# Patient Record
Sex: FEMALE | Race: WHITE | Employment: UNEMPLOYED | URBAN - METROPOLITAN AREA
[De-identification: names, ages, dates, MRNs, and addresses within clinical notes are randomized per-mention and may not be internally consistent; named-entity substitution may affect disease eponyms.]

---

## 2019-01-01 ENCOUNTER — DOCUMENTATION ONLY (OUTPATIENT)
Dept: ONCOLOGY | Age: 64
End: 2019-01-01

## 2019-01-01 ENCOUNTER — APPOINTMENT (OUTPATIENT)
Dept: GENERAL RADIOLOGY | Age: 64
DRG: 180 | End: 2019-01-01
Attending: STUDENT IN AN ORGANIZED HEALTH CARE EDUCATION/TRAINING PROGRAM
Payer: COMMERCIAL

## 2019-01-01 ENCOUNTER — APPOINTMENT (OUTPATIENT)
Dept: GENERAL RADIOLOGY | Age: 64
DRG: 180 | End: 2019-01-01
Attending: INTERNAL MEDICINE
Payer: COMMERCIAL

## 2019-01-01 ENCOUNTER — HOSPITAL ENCOUNTER (EMERGENCY)
Age: 64
Discharge: HOME | DRG: 180 | End: 2019-02-18
Attending: EMERGENCY MEDICINE
Payer: COMMERCIAL

## 2019-01-01 ENCOUNTER — APPOINTMENT (OUTPATIENT)
Dept: GENERAL RADIOLOGY | Age: 64
DRG: 180 | End: 2019-01-01
Attending: HOSPITALIST
Payer: COMMERCIAL

## 2019-01-01 ENCOUNTER — APPOINTMENT (OUTPATIENT)
Dept: ULTRASOUND IMAGING | Age: 64
DRG: 180 | End: 2019-01-01
Attending: HOSPITALIST
Payer: COMMERCIAL

## 2019-01-01 ENCOUNTER — TELEPHONE (OUTPATIENT)
Dept: ONCOLOGY | Age: 64
End: 2019-01-01

## 2019-01-01 ENCOUNTER — HOSPICE ADMISSION (OUTPATIENT)
Dept: HOSPICE | Facility: HOSPICE | Age: 64
End: 2019-01-01

## 2019-01-01 ENCOUNTER — OFFICE VISIT (OUTPATIENT)
Dept: ONCOLOGY | Age: 64
End: 2019-01-01

## 2019-01-01 ENCOUNTER — APPOINTMENT (OUTPATIENT)
Dept: CT IMAGING | Age: 64
DRG: 180 | End: 2019-01-01
Attending: INTERNAL MEDICINE
Payer: COMMERCIAL

## 2019-01-01 ENCOUNTER — HOSPITAL ENCOUNTER (INPATIENT)
Age: 64
LOS: 3 days | Discharge: HOSPICE | DRG: 180 | End: 2019-02-22
Attending: EMERGENCY MEDICINE | Admitting: HOSPITALIST
Payer: COMMERCIAL

## 2019-01-01 VITALS
WEIGHT: 85.8 LBS | BODY MASS INDEX: 15.79 KG/M2 | HEART RATE: 133 BPM | SYSTOLIC BLOOD PRESSURE: 109 MMHG | HEIGHT: 62 IN | RESPIRATION RATE: 24 BRPM | OXYGEN SATURATION: 97 % | DIASTOLIC BLOOD PRESSURE: 61 MMHG

## 2019-01-01 VITALS
HEART RATE: 129 BPM | TEMPERATURE: 98.5 F | WEIGHT: 88 LBS | RESPIRATION RATE: 21 BRPM | DIASTOLIC BLOOD PRESSURE: 83 MMHG | SYSTOLIC BLOOD PRESSURE: 119 MMHG | HEIGHT: 62 IN | OXYGEN SATURATION: 96 % | BODY MASS INDEX: 16.2 KG/M2

## 2019-01-01 VITALS
RESPIRATION RATE: 21 BRPM | TEMPERATURE: 99 F | WEIGHT: 88 LBS | OXYGEN SATURATION: 95 % | DIASTOLIC BLOOD PRESSURE: 66 MMHG | HEIGHT: 62 IN | BODY MASS INDEX: 16.2 KG/M2 | SYSTOLIC BLOOD PRESSURE: 97 MMHG | HEART RATE: 137 BPM

## 2019-01-01 DIAGNOSIS — C34.90 MALIGNANT NEOPLASM OF LUNG, UNSPECIFIED LATERALITY, UNSPECIFIED PART OF LUNG (HCC): Chronic | ICD-10-CM

## 2019-01-01 DIAGNOSIS — R62.7 FAILURE TO THRIVE IN ADULT: Chronic | ICD-10-CM

## 2019-01-01 DIAGNOSIS — R63.0 POOR APPETITE: ICD-10-CM

## 2019-01-01 DIAGNOSIS — R64 CACHEXIA (HCC): ICD-10-CM

## 2019-01-01 DIAGNOSIS — F41.9 ANXIETY: ICD-10-CM

## 2019-01-01 DIAGNOSIS — R07.89 CHEST WALL PAIN: ICD-10-CM

## 2019-01-01 DIAGNOSIS — J91.0 PLEURAL EFFUSION, MALIGNANT: Chronic | ICD-10-CM

## 2019-01-01 DIAGNOSIS — R45.1 AGITATION: ICD-10-CM

## 2019-01-01 DIAGNOSIS — C34.90 MALIGNANT NEOPLASM OF LUNG, UNSPECIFIED LATERALITY, UNSPECIFIED PART OF LUNG (HCC): Primary | ICD-10-CM

## 2019-01-01 DIAGNOSIS — C34.00 MALIGNANT NEOPLASM OF HILUS OF LUNG, UNSPECIFIED LATERALITY (HCC): Chronic | ICD-10-CM

## 2019-01-01 DIAGNOSIS — R06.09 OTHER FORM OF DYSPNEA: ICD-10-CM

## 2019-01-01 DIAGNOSIS — R13.10 DYSPHAGIA, UNSPECIFIED TYPE: ICD-10-CM

## 2019-01-01 DIAGNOSIS — R06.02 SOB (SHORTNESS OF BREATH): ICD-10-CM

## 2019-01-01 DIAGNOSIS — R64 MALIGNANT CACHEXIA (HCC): Chronic | ICD-10-CM

## 2019-01-01 DIAGNOSIS — K59.03 DRUG INDUCED CONSTIPATION: ICD-10-CM

## 2019-01-01 DIAGNOSIS — Z51.5 ENCOUNTER FOR PALLIATIVE CARE: ICD-10-CM

## 2019-01-01 DIAGNOSIS — Z71.89 DNR (DO NOT RESUSCITATE) DISCUSSION: ICD-10-CM

## 2019-01-01 DIAGNOSIS — Z98.890 HISTORY OF LUNG SURGERY: ICD-10-CM

## 2019-01-01 DIAGNOSIS — C34.00 MALIGNANT NEOPLASM OF HILUS OF LUNG, UNSPECIFIED LATERALITY (HCC): Primary | ICD-10-CM

## 2019-01-01 DIAGNOSIS — R06.02 SHORTNESS OF BREATH: ICD-10-CM

## 2019-01-01 DIAGNOSIS — R63.4 WEIGHT LOSS: ICD-10-CM

## 2019-01-01 DIAGNOSIS — Z51.5 DYING CARE: ICD-10-CM

## 2019-01-01 DIAGNOSIS — C34.90 STAGE 4 MALIGNANT NEOPLASM OF LUNG, UNSPECIFIED LATERALITY (HCC): Primary | ICD-10-CM

## 2019-01-01 LAB
ALBUMIN SERPL-MCNC: 2.6 G/DL (ref 3.5–5)
ALBUMIN/GLOB SERPL: 0.5 {RATIO} (ref 1.1–2.2)
ALP SERPL-CCNC: 91 U/L (ref 45–117)
ALT SERPL-CCNC: 14 U/L (ref 12–78)
ANION GAP SERPL CALC-SCNC: 10 MMOL/L (ref 5–15)
ANION GAP SERPL CALC-SCNC: 10 MMOL/L (ref 5–15)
ANION GAP SERPL CALC-SCNC: 12 MMOL/L (ref 5–15)
ANION GAP SERPL CALC-SCNC: 6 MMOL/L (ref 5–15)
APPEARANCE UR: ABNORMAL
ARTERIAL PATENCY WRIST A: YES
AST SERPL-CCNC: 12 U/L (ref 15–37)
ATRIAL RATE: 130 BPM
ATRIAL RATE: 132 BPM
BACTERIA SPEC CULT: NORMAL
BACTERIA URNS QL MICRO: ABNORMAL /HPF
BASE DEFICIT BLD-SCNC: 2 MMOL/L
BASOPHILS # BLD: 0 K/UL (ref 0–0.1)
BASOPHILS # BLD: 0 K/UL (ref 0–0.1)
BASOPHILS NFR BLD: 0 % (ref 0–1)
BASOPHILS NFR BLD: 0 % (ref 0–1)
BDY SITE: ABNORMAL
BILIRUB SERPL-MCNC: 0.3 MG/DL (ref 0.2–1)
BILIRUB UR QL: NEGATIVE
BNP SERPL-MCNC: 985 PG/ML (ref 0–125)
BODY FLD TYPE: NORMAL
BUN SERPL-MCNC: 10 MG/DL (ref 6–20)
BUN SERPL-MCNC: 11 MG/DL (ref 6–20)
BUN SERPL-MCNC: 11 MG/DL (ref 6–20)
BUN SERPL-MCNC: 13 MG/DL (ref 6–20)
BUN/CREAT SERPL: 18 (ref 12–20)
BUN/CREAT SERPL: 18 (ref 12–20)
BUN/CREAT SERPL: 20 (ref 12–20)
BUN/CREAT SERPL: 22 (ref 12–20)
CA-I BLD-SCNC: 1.21 MMOL/L (ref 1.12–1.32)
CALCIUM SERPL-MCNC: 8.9 MG/DL (ref 8.5–10.1)
CALCIUM SERPL-MCNC: 8.9 MG/DL (ref 8.5–10.1)
CALCIUM SERPL-MCNC: 9.3 MG/DL (ref 8.5–10.1)
CALCIUM SERPL-MCNC: 9.9 MG/DL (ref 8.5–10.1)
CALCULATED P AXIS, ECG09: 74 DEGREES
CALCULATED P AXIS, ECG09: 77 DEGREES
CALCULATED R AXIS, ECG10: 61 DEGREES
CALCULATED R AXIS, ECG10: 67 DEGREES
CALCULATED T AXIS, ECG11: 70 DEGREES
CALCULATED T AXIS, ECG11: 72 DEGREES
CHLORIDE SERPL-SCNC: 101 MMOL/L (ref 97–108)
CHLORIDE SERPL-SCNC: 103 MMOL/L (ref 97–108)
CHLORIDE SERPL-SCNC: 98 MMOL/L (ref 97–108)
CHLORIDE SERPL-SCNC: 99 MMOL/L (ref 97–108)
CK MB CFR SERPL CALC: NORMAL % (ref 0–2.5)
CK MB SERPL-MCNC: <1 NG/ML (ref 5–25)
CK SERPL-CCNC: 29 U/L (ref 26–192)
CO2 SERPL-SCNC: 27 MMOL/L (ref 21–32)
CO2 SERPL-SCNC: 29 MMOL/L (ref 21–32)
COLOR UR: ABNORMAL
COMMENT, HOLDF: NORMAL
CREAT SERPL-MCNC: 0.51 MG/DL (ref 0.55–1.02)
CREAT SERPL-MCNC: 0.55 MG/DL (ref 0.55–1.02)
CREAT SERPL-MCNC: 0.57 MG/DL (ref 0.55–1.02)
CREAT SERPL-MCNC: 0.74 MG/DL (ref 0.55–1.02)
DIAGNOSIS, 93000: NORMAL
DIAGNOSIS, 93000: NORMAL
DIFFERENTIAL METHOD BLD: ABNORMAL
DIFFERENTIAL METHOD BLD: ABNORMAL
EOSINOPHIL # BLD: 0.1 K/UL (ref 0–0.4)
EOSINOPHIL # BLD: 0.1 K/UL (ref 0–0.4)
EOSINOPHIL NFR BLD: 1 % (ref 0–7)
EOSINOPHIL NFR BLD: 1 % (ref 0–7)
EPITH CASTS URNS QL MICRO: ABNORMAL /LPF
ERYTHROCYTE [DISTWIDTH] IN BLOOD BY AUTOMATED COUNT: 14.4 % (ref 11.5–14.5)
ERYTHROCYTE [DISTWIDTH] IN BLOOD BY AUTOMATED COUNT: 14.5 % (ref 11.5–14.5)
ERYTHROCYTE [DISTWIDTH] IN BLOOD BY AUTOMATED COUNT: 14.6 % (ref 11.5–14.5)
ERYTHROCYTE [DISTWIDTH] IN BLOOD BY AUTOMATED COUNT: 15 % (ref 11.5–14.5)
FLUAV AG NPH QL IA: NEGATIVE
FLUBV AG NOSE QL IA: NEGATIVE
GAS FLOW.O2 O2 DELIVERY SYS: ABNORMAL L/MIN
GAS FLOW.O2 SETTING OXYMISER: 2 L/M
GLOBULIN SER CALC-MCNC: 5.4 G/DL (ref 2–4)
GLUCOSE SERPL-MCNC: 107 MG/DL (ref 65–100)
GLUCOSE SERPL-MCNC: 109 MG/DL (ref 65–100)
GLUCOSE SERPL-MCNC: 112 MG/DL (ref 65–100)
GLUCOSE SERPL-MCNC: 119 MG/DL (ref 65–100)
GLUCOSE UR STRIP.AUTO-MCNC: NEGATIVE MG/DL
GRAM STN SPEC: NORMAL
GRAM STN SPEC: NORMAL
HCO3 BLD-SCNC: 24.5 MMOL/L (ref 22–26)
HCT VFR BLD AUTO: 32.6 % (ref 35–47)
HCT VFR BLD AUTO: 33.3 % (ref 35–47)
HCT VFR BLD AUTO: 35.5 % (ref 35–47)
HCT VFR BLD AUTO: 36 % (ref 35–47)
HGB BLD-MCNC: 10.2 G/DL (ref 11.5–16)
HGB BLD-MCNC: 10.6 G/DL (ref 11.5–16)
HGB BLD-MCNC: 10.9 G/DL (ref 11.5–16)
HGB BLD-MCNC: 9.9 G/DL (ref 11.5–16)
HGB UR QL STRIP: NEGATIVE
IMM GRANULOCYTES # BLD AUTO: 0 K/UL (ref 0–0.04)
IMM GRANULOCYTES # BLD AUTO: 0.1 K/UL (ref 0–0.04)
IMM GRANULOCYTES NFR BLD AUTO: 0 % (ref 0–0.5)
IMM GRANULOCYTES NFR BLD AUTO: 1 % (ref 0–0.5)
INR PPP: 1.2 (ref 0.9–1.1)
KETONES UR QL STRIP.AUTO: 15 MG/DL
LDH FLD L TO P-CCNC: 48 U/L
LEUKOCYTE ESTERASE UR QL STRIP.AUTO: ABNORMAL
LYMPHOCYTES # BLD: 0.6 K/UL (ref 0.8–3.5)
LYMPHOCYTES # BLD: 0.7 K/UL (ref 0.8–3.5)
LYMPHOCYTES NFR BLD: 6 % (ref 12–49)
LYMPHOCYTES NFR BLD: 6 % (ref 12–49)
MCH RBC QN AUTO: 24.6 PG (ref 26–34)
MCH RBC QN AUTO: 24.7 PG (ref 26–34)
MCH RBC QN AUTO: 24.9 PG (ref 26–34)
MCH RBC QN AUTO: 25.1 PG (ref 26–34)
MCHC RBC AUTO-ENTMCNC: 29.9 G/DL (ref 30–36.5)
MCHC RBC AUTO-ENTMCNC: 30.3 G/DL (ref 30–36.5)
MCHC RBC AUTO-ENTMCNC: 30.4 G/DL (ref 30–36.5)
MCHC RBC AUTO-ENTMCNC: 30.6 G/DL (ref 30–36.5)
MCV RBC AUTO: 80.9 FL (ref 80–99)
MCV RBC AUTO: 81.8 FL (ref 80–99)
MCV RBC AUTO: 82.4 FL (ref 80–99)
MCV RBC AUTO: 82.6 FL (ref 80–99)
MONOCYTES # BLD: 0.9 K/UL (ref 0–1)
MONOCYTES # BLD: 1.1 K/UL (ref 0–1)
MONOCYTES NFR BLD: 8 % (ref 5–13)
MONOCYTES NFR BLD: 9 % (ref 5–13)
NEUTS SEG # BLD: 8.8 K/UL (ref 1.8–8)
NEUTS SEG # BLD: 9.8 K/UL (ref 1.8–8)
NEUTS SEG NFR BLD: 84 % (ref 32–75)
NEUTS SEG NFR BLD: 85 % (ref 32–75)
NITRITE UR QL STRIP.AUTO: NEGATIVE
NRBC # BLD: 0 K/UL (ref 0–0.01)
NRBC BLD-RTO: 0 PER 100 WBC
P-R INTERVAL, ECG05: 130 MS
P-R INTERVAL, ECG05: 142 MS
PCO2 BLD: 52.1 MMHG (ref 35–45)
PH BLD: 7.28 [PH] (ref 7.35–7.45)
PH FLD: 8 [PH]
PH UR STRIP: 6.5 [PH] (ref 5–8)
PLATELET # BLD AUTO: 529 K/UL (ref 150–400)
PLATELET # BLD AUTO: 566 K/UL (ref 150–400)
PLATELET # BLD AUTO: 566 K/UL (ref 150–400)
PLATELET # BLD AUTO: 584 K/UL (ref 150–400)
PMV BLD AUTO: 10.1 FL (ref 8.9–12.9)
PMV BLD AUTO: 10.5 FL (ref 8.9–12.9)
PMV BLD AUTO: 9.5 FL (ref 8.9–12.9)
PMV BLD AUTO: 9.6 FL (ref 8.9–12.9)
PO2 BLD: 68 MMHG (ref 80–100)
POTASSIUM SERPL-SCNC: 3.2 MMOL/L (ref 3.5–5.1)
POTASSIUM SERPL-SCNC: 3.2 MMOL/L (ref 3.5–5.1)
POTASSIUM SERPL-SCNC: 3.7 MMOL/L (ref 3.5–5.1)
POTASSIUM SERPL-SCNC: 4.2 MMOL/L (ref 3.5–5.1)
PROT FLD-MCNC: 2.6 G/DL
PROT SERPL-MCNC: 8 G/DL (ref 6.4–8.2)
PROT UR STRIP-MCNC: ABNORMAL MG/DL
PROTHROMBIN TIME: 12.3 SEC (ref 9–11.1)
Q-T INTERVAL, ECG07: 278 MS
Q-T INTERVAL, ECG07: 306 MS
QRS DURATION, ECG06: 54 MS
QRS DURATION, ECG06: 68 MS
QTC CALCULATION (BEZET), ECG08: 411 MS
QTC CALCULATION (BEZET), ECG08: 450 MS
RBC # BLD AUTO: 4.03 M/UL (ref 3.8–5.2)
RBC # BLD AUTO: 4.07 M/UL (ref 3.8–5.2)
RBC # BLD AUTO: 4.3 M/UL (ref 3.8–5.2)
RBC # BLD AUTO: 4.37 M/UL (ref 3.8–5.2)
RBC #/AREA URNS HPF: ABNORMAL /HPF (ref 0–5)
RBC MORPH BLD: ABNORMAL
SAMPLES BEING HELD,HOLD: NORMAL
SAO2 % BLD: 90 % (ref 92–97)
SERVICE CMNT-IMP: NORMAL
SODIUM SERPL-SCNC: 134 MMOL/L (ref 136–145)
SODIUM SERPL-SCNC: 137 MMOL/L (ref 136–145)
SODIUM SERPL-SCNC: 138 MMOL/L (ref 136–145)
SODIUM SERPL-SCNC: 140 MMOL/L (ref 136–145)
SP GR UR REFRACTOMETRY: 1.02 (ref 1–1.03)
SPECIMEN SOURCE FLD: NORMAL
SPECIMEN SOURCE FLD: NORMAL
SPECIMEN TYPE: ABNORMAL
TOTAL RESP. RATE, ITRR: 24
TROPONIN I SERPL-MCNC: <0.05 NG/ML
TSH SERPL DL<=0.05 MIU/L-ACNC: 1.21 UIU/ML (ref 0.36–3.74)
UROBILINOGEN UR QL STRIP.AUTO: 0.2 EU/DL (ref 0.2–1)
VENTRICULAR RATE, ECG03: 130 BPM
VENTRICULAR RATE, ECG03: 132 BPM
WBC # BLD AUTO: 10.3 K/UL (ref 3.6–11)
WBC # BLD AUTO: 10.4 K/UL (ref 3.6–11)
WBC # BLD AUTO: 11.7 K/UL (ref 3.6–11)
WBC # BLD AUTO: 9.9 K/UL (ref 3.6–11)
WBC URNS QL MICRO: ABNORMAL /HPF (ref 0–4)

## 2019-01-01 PROCEDURE — 96360 HYDRATION IV INFUSION INIT: CPT

## 2019-01-01 PROCEDURE — 94640 AIRWAY INHALATION TREATMENT: CPT

## 2019-01-01 PROCEDURE — 80048 BASIC METABOLIC PNL TOTAL CA: CPT

## 2019-01-01 PROCEDURE — 74011636637 HC RX REV CODE- 636/637: Performed by: INTERNAL MEDICINE

## 2019-01-01 PROCEDURE — 85025 COMPLETE CBC W/AUTO DIFF WBC: CPT

## 2019-01-01 PROCEDURE — 85610 PROTHROMBIN TIME: CPT

## 2019-01-01 PROCEDURE — 65270000032 HC RM SEMIPRIVATE

## 2019-01-01 PROCEDURE — 99284 EMERGENCY DEPT VISIT MOD MDM: CPT

## 2019-01-01 PROCEDURE — 82550 ASSAY OF CK (CPK): CPT

## 2019-01-01 PROCEDURE — 85027 COMPLETE CBC AUTOMATED: CPT

## 2019-01-01 PROCEDURE — 74011250636 HC RX REV CODE- 250/636: Performed by: INTERNAL MEDICINE

## 2019-01-01 PROCEDURE — 74011000250 HC RX REV CODE- 250: Performed by: EMERGENCY MEDICINE

## 2019-01-01 PROCEDURE — 74011000250 HC RX REV CODE- 250: Performed by: INTERNAL MEDICINE

## 2019-01-01 PROCEDURE — 96361 HYDRATE IV INFUSION ADD-ON: CPT

## 2019-01-01 PROCEDURE — 83615 LACTATE (LD) (LDH) ENZYME: CPT

## 2019-01-01 PROCEDURE — 94664 DEMO&/EVAL PT USE INHALER: CPT

## 2019-01-01 PROCEDURE — 65270000029 HC RM PRIVATE

## 2019-01-01 PROCEDURE — 88342 IMHCHEM/IMCYTCHM 1ST ANTB: CPT

## 2019-01-01 PROCEDURE — 80053 COMPREHEN METABOLIC PANEL: CPT

## 2019-01-01 PROCEDURE — 88341 IMHCHEM/IMCYTCHM EA ADD ANTB: CPT

## 2019-01-01 PROCEDURE — 87015 SPECIMEN INFECT AGNT CONCNTJ: CPT

## 2019-01-01 PROCEDURE — 81001 URINALYSIS AUTO W/SCOPE: CPT

## 2019-01-01 PROCEDURE — 92610 EVALUATE SWALLOWING FUNCTION: CPT | Performed by: SPEECH-LANGUAGE PATHOLOGIST

## 2019-01-01 PROCEDURE — 84484 ASSAY OF TROPONIN QUANT: CPT

## 2019-01-01 PROCEDURE — 93005 ELECTROCARDIOGRAM TRACING: CPT

## 2019-01-01 PROCEDURE — 36415 COLL VENOUS BLD VENIPUNCTURE: CPT

## 2019-01-01 PROCEDURE — 88112 CYTOPATH CELL ENHANCE TECH: CPT

## 2019-01-01 PROCEDURE — 74011250636 HC RX REV CODE- 250/636: Performed by: EMERGENCY MEDICINE

## 2019-01-01 PROCEDURE — 87804 INFLUENZA ASSAY W/OPTIC: CPT

## 2019-01-01 PROCEDURE — 87205 SMEAR GRAM STAIN: CPT

## 2019-01-01 PROCEDURE — 94762 N-INVAS EAR/PLS OXIMTRY CONT: CPT

## 2019-01-01 PROCEDURE — 74011250636 HC RX REV CODE- 250/636: Performed by: NURSE PRACTITIONER

## 2019-01-01 PROCEDURE — 74011000250 HC RX REV CODE- 250: Performed by: RADIOLOGY

## 2019-01-01 PROCEDURE — 36600 WITHDRAWAL OF ARTERIAL BLOOD: CPT

## 2019-01-01 PROCEDURE — 99285 EMERGENCY DEPT VISIT HI MDM: CPT

## 2019-01-01 PROCEDURE — 74011250637 HC RX REV CODE- 250/637: Performed by: INTERNAL MEDICINE

## 2019-01-01 PROCEDURE — C1729 CATH, DRAINAGE: HCPCS

## 2019-01-01 PROCEDURE — 32555 ASPIRATE PLEURA W/ IMAGING: CPT

## 2019-01-01 PROCEDURE — 71046 X-RAY EXAM CHEST 2 VIEWS: CPT

## 2019-01-01 PROCEDURE — 74011250636 HC RX REV CODE- 250/636: Performed by: HOSPITALIST

## 2019-01-01 PROCEDURE — 74011250636 HC RX REV CODE- 250/636: Performed by: RADIOLOGY

## 2019-01-01 PROCEDURE — 82803 BLOOD GASES ANY COMBINATION: CPT

## 2019-01-01 PROCEDURE — 71045 X-RAY EXAM CHEST 1 VIEW: CPT

## 2019-01-01 PROCEDURE — 74011250637 HC RX REV CODE- 250/637: Performed by: EMERGENCY MEDICINE

## 2019-01-01 PROCEDURE — 83986 ASSAY PH BODY FLUID NOS: CPT

## 2019-01-01 PROCEDURE — 84157 ASSAY OF PROTEIN OTHER: CPT

## 2019-01-01 PROCEDURE — 83880 ASSAY OF NATRIURETIC PEPTIDE: CPT

## 2019-01-01 PROCEDURE — 76450000000

## 2019-01-01 PROCEDURE — 84443 ASSAY THYROID STIM HORMONE: CPT

## 2019-01-01 PROCEDURE — 74011250637 HC RX REV CODE- 250/637: Performed by: NURSE PRACTITIONER

## 2019-01-01 PROCEDURE — 77030029684 HC NEB SM VOL KT MONA -A

## 2019-01-01 PROCEDURE — 0W993ZZ DRAINAGE OF RIGHT PLEURAL CAVITY, PERCUTANEOUS APPROACH: ICD-10-PCS | Performed by: RADIOLOGY

## 2019-01-01 PROCEDURE — 88305 TISSUE EXAM BY PATHOLOGIST: CPT

## 2019-01-01 PROCEDURE — 74011000250 HC RX REV CODE- 250: Performed by: HOSPITALIST

## 2019-01-01 RX ORDER — POTASSIUM CHLORIDE 7.45 MG/ML
10 INJECTION INTRAVENOUS ONCE
Status: DISCONTINUED | OUTPATIENT
Start: 2019-01-01 | End: 2019-01-01

## 2019-01-01 RX ORDER — HEPARIN 100 UNIT/ML
300 SYRINGE INTRAVENOUS AS NEEDED
Status: DISCONTINUED | OUTPATIENT
Start: 2019-01-01 | End: 2019-01-01 | Stop reason: HOSPADM

## 2019-01-01 RX ORDER — FAMOTIDINE 20 MG/1
20 TABLET, FILM COATED ORAL 2 TIMES DAILY
Qty: 60 TAB | Refills: 0 | Status: SHIPPED | OUTPATIENT
Start: 2019-01-01

## 2019-01-01 RX ORDER — GUAIFENESIN 600 MG/1
600 TABLET, EXTENDED RELEASE ORAL EVERY 12 HOURS
Status: DISCONTINUED | OUTPATIENT
Start: 2019-01-01 | End: 2019-01-01

## 2019-01-01 RX ORDER — LORAZEPAM 2 MG/ML
1 INJECTION INTRAMUSCULAR
Status: DISCONTINUED | OUTPATIENT
Start: 2019-01-01 | End: 2019-01-01

## 2019-01-01 RX ORDER — SODIUM CHLORIDE 9 MG/ML
100 INJECTION, SOLUTION INTRAVENOUS CONTINUOUS
Status: DISCONTINUED | OUTPATIENT
Start: 2019-01-01 | End: 2019-01-01 | Stop reason: HOSPADM

## 2019-01-01 RX ORDER — LORAZEPAM 2 MG/ML
INJECTION INTRAMUSCULAR
Status: DISCONTINUED
Start: 2019-01-01 | End: 2019-01-01

## 2019-01-01 RX ORDER — IPRATROPIUM BROMIDE AND ALBUTEROL SULFATE 2.5; .5 MG/3ML; MG/3ML
SOLUTION RESPIRATORY (INHALATION)
Status: DISCONTINUED
Start: 2019-01-01 | End: 2019-01-01 | Stop reason: HOSPADM

## 2019-01-01 RX ORDER — IPRATROPIUM BROMIDE AND ALBUTEROL SULFATE 2.5; .5 MG/3ML; MG/3ML
3 SOLUTION RESPIRATORY (INHALATION)
Status: DISCONTINUED | OUTPATIENT
Start: 2019-01-01 | End: 2019-01-01 | Stop reason: HOSPADM

## 2019-01-01 RX ORDER — PREDNISONE 20 MG/1
20 TABLET ORAL
Status: DISCONTINUED | OUTPATIENT
Start: 2019-01-01 | End: 2019-01-01 | Stop reason: HOSPADM

## 2019-01-01 RX ORDER — HYDROMORPHONE HYDROCHLORIDE 1 MG/ML
1 INJECTION, SOLUTION INTRAMUSCULAR; INTRAVENOUS; SUBCUTANEOUS
Status: COMPLETED | OUTPATIENT
Start: 2019-01-01 | End: 2019-01-01

## 2019-01-01 RX ORDER — ALPRAZOLAM 0.25 MG/1
0.25 TABLET ORAL 2 TIMES DAILY
Status: DISCONTINUED | OUTPATIENT
Start: 2019-01-01 | End: 2019-01-01 | Stop reason: HOSPADM

## 2019-01-01 RX ORDER — MORPHINE SULFATE 20 MG/ML
5 SOLUTION ORAL
Qty: 15 ML | Refills: 0 | Status: SHIPPED | OUTPATIENT
Start: 2019-01-01

## 2019-01-01 RX ORDER — ACETAMINOPHEN 325 MG/1
650 TABLET ORAL
Status: DISCONTINUED | OUTPATIENT
Start: 2019-01-01 | End: 2019-01-01 | Stop reason: HOSPADM

## 2019-01-01 RX ORDER — ACETAMINOPHEN 325 MG/1
650 TABLET ORAL
Status: COMPLETED | OUTPATIENT
Start: 2019-01-01 | End: 2019-01-01

## 2019-01-01 RX ORDER — AMOXICILLIN 250 MG
1 CAPSULE ORAL
Qty: 30 TAB | Refills: 0 | Status: SHIPPED | OUTPATIENT
Start: 2019-01-01

## 2019-01-01 RX ORDER — LORAZEPAM 2 MG/ML
0.5 INJECTION INTRAMUSCULAR ONCE
Status: COMPLETED | OUTPATIENT
Start: 2019-01-01 | End: 2019-01-01

## 2019-01-01 RX ORDER — IPRATROPIUM BROMIDE AND ALBUTEROL SULFATE 2.5; .5 MG/3ML; MG/3ML
3 SOLUTION RESPIRATORY (INHALATION)
Qty: 30 NEBULE | Refills: 0 | Status: SHIPPED | OUTPATIENT
Start: 2019-01-01

## 2019-01-01 RX ORDER — ALPRAZOLAM 0.25 MG/1
0.25 TABLET ORAL 2 TIMES DAILY
Qty: 20 TAB | Refills: 0 | Status: SHIPPED | OUTPATIENT
Start: 2019-01-01 | End: 2019-01-01

## 2019-01-01 RX ORDER — FAMOTIDINE 20 MG/1
20 TABLET, FILM COATED ORAL 2 TIMES DAILY
Status: DISCONTINUED | OUTPATIENT
Start: 2019-01-01 | End: 2019-01-01 | Stop reason: HOSPADM

## 2019-01-01 RX ORDER — AMOXICILLIN 250 MG
1 CAPSULE ORAL
Status: DISCONTINUED | OUTPATIENT
Start: 2019-01-01 | End: 2019-01-01 | Stop reason: HOSPADM

## 2019-01-01 RX ORDER — MORPHINE SULFATE 2 MG/ML
4 INJECTION, SOLUTION INTRAMUSCULAR; INTRAVENOUS
Status: COMPLETED | OUTPATIENT
Start: 2019-01-01 | End: 2019-01-01

## 2019-01-01 RX ORDER — POTASSIUM CHLORIDE 750 MG/1
40 TABLET, FILM COATED, EXTENDED RELEASE ORAL
Status: DISCONTINUED | OUTPATIENT
Start: 2019-01-01 | End: 2019-01-01

## 2019-01-01 RX ORDER — METOPROLOL TARTRATE 5 MG/5ML
5 INJECTION INTRAVENOUS ONCE
Status: COMPLETED | OUTPATIENT
Start: 2019-01-01 | End: 2019-01-01

## 2019-01-01 RX ORDER — SODIUM CHLORIDE 0.9 % (FLUSH) 0.9 %
5-40 SYRINGE (ML) INJECTION AS NEEDED
Status: DISCONTINUED | OUTPATIENT
Start: 2019-01-01 | End: 2019-01-01 | Stop reason: HOSPADM

## 2019-01-01 RX ORDER — ENOXAPARIN SODIUM 100 MG/ML
40 INJECTION SUBCUTANEOUS EVERY 24 HOURS
Status: DISCONTINUED | OUTPATIENT
Start: 2019-01-01 | End: 2019-01-01

## 2019-01-01 RX ORDER — PREDNISONE 20 MG/1
20 TABLET ORAL
Qty: 5 TAB | Refills: 0 | Status: SHIPPED | OUTPATIENT
Start: 2019-01-01 | End: 2019-01-01

## 2019-01-01 RX ORDER — ACETAMINOPHEN 325 MG/1
TABLET ORAL
Status: DISPENSED
Start: 2019-01-01 | End: 2019-01-01

## 2019-01-01 RX ORDER — MORPHINE SULFATE 15 MG/1
15 TABLET ORAL
Status: DISCONTINUED | OUTPATIENT
Start: 2019-01-01 | End: 2019-01-01 | Stop reason: HOSPADM

## 2019-01-01 RX ORDER — SODIUM BICARBONATE 42 MG/ML
2 INJECTION, SOLUTION INTRAVENOUS
Status: COMPLETED | OUTPATIENT
Start: 2019-01-01 | End: 2019-01-01

## 2019-01-01 RX ORDER — LIDOCAINE HYDROCHLORIDE 10 MG/ML
10 INJECTION, SOLUTION EPIDURAL; INFILTRATION; INTRACAUDAL; PERINEURAL ONCE
Status: COMPLETED | OUTPATIENT
Start: 2019-01-01 | End: 2019-01-01

## 2019-01-01 RX ORDER — SODIUM CHLORIDE 9 MG/ML
150 INJECTION, SOLUTION INTRAVENOUS CONTINUOUS
Status: DISPENSED | OUTPATIENT
Start: 2019-01-01 | End: 2019-01-01

## 2019-01-01 RX ORDER — MORPHINE SULFATE 15 MG/1
15 TABLET ORAL
Qty: 15 TAB | Refills: 0 | Status: SHIPPED | OUTPATIENT
Start: 2019-01-01 | End: 2019-01-01

## 2019-01-01 RX ORDER — SODIUM CHLORIDE 9 MG/ML
150 INJECTION, SOLUTION INTRAVENOUS CONTINUOUS
Status: DISCONTINUED | OUTPATIENT
Start: 2019-01-01 | End: 2019-01-01

## 2019-01-01 RX ORDER — ACETAMINOPHEN 325 MG/1
650 TABLET ORAL ONCE
Status: COMPLETED | OUTPATIENT
Start: 2019-01-01 | End: 2019-01-01

## 2019-01-01 RX ORDER — MORPHINE SULFATE 2 MG/ML
2 INJECTION, SOLUTION INTRAMUSCULAR; INTRAVENOUS ONCE
Status: COMPLETED | OUTPATIENT
Start: 2019-01-01 | End: 2019-01-01

## 2019-01-01 RX ORDER — LIDOCAINE HYDROCHLORIDE 10 MG/ML
10 INJECTION, SOLUTION EPIDURAL; INFILTRATION; INTRACAUDAL; PERINEURAL
Status: COMPLETED | OUTPATIENT
Start: 2019-01-01 | End: 2019-01-01

## 2019-01-01 RX ORDER — LORAZEPAM 2 MG/ML
1 INJECTION INTRAMUSCULAR ONCE
Status: COMPLETED | OUTPATIENT
Start: 2019-01-01 | End: 2019-01-01

## 2019-01-01 RX ORDER — FUROSEMIDE 10 MG/ML
40 INJECTION INTRAMUSCULAR; INTRAVENOUS ONCE
Status: ACTIVE | OUTPATIENT
Start: 2019-01-01 | End: 2019-01-01

## 2019-01-01 RX ORDER — ALBUTEROL SULFATE 0.83 MG/ML
SOLUTION RESPIRATORY (INHALATION)
Status: DISCONTINUED
Start: 2019-01-01 | End: 2019-01-01 | Stop reason: HOSPADM

## 2019-01-01 RX ORDER — LEVOFLOXACIN 5 MG/ML
500 INJECTION, SOLUTION INTRAVENOUS EVERY 24 HOURS
Status: DISCONTINUED | OUTPATIENT
Start: 2019-01-01 | End: 2019-01-01

## 2019-01-01 RX ORDER — NALOXONE HYDROCHLORIDE 4 MG/.1ML
SPRAY NASAL
Qty: 1 EACH | Refills: 0 | Status: SHIPPED | OUTPATIENT
Start: 2019-01-01

## 2019-01-01 RX ORDER — LORAZEPAM 2 MG/ML
0.5 CONCENTRATE ORAL
Qty: 30 ML | Refills: 0 | Status: SHIPPED | OUTPATIENT
Start: 2019-01-01

## 2019-01-01 RX ORDER — HYDROMORPHONE HYDROCHLORIDE 1 MG/ML
1 INJECTION, SOLUTION INTRAMUSCULAR; INTRAVENOUS; SUBCUTANEOUS
Status: DISCONTINUED | OUTPATIENT
Start: 2019-01-01 | End: 2019-01-01 | Stop reason: HOSPADM

## 2019-01-01 RX ORDER — SODIUM CHLORIDE 0.9 % (FLUSH) 0.9 %
5-40 SYRINGE (ML) INJECTION EVERY 8 HOURS
Status: DISCONTINUED | OUTPATIENT
Start: 2019-01-01 | End: 2019-01-01

## 2019-01-01 RX ORDER — LORAZEPAM 2 MG/ML
1 INJECTION INTRAMUSCULAR
Status: DISCONTINUED | OUTPATIENT
Start: 2019-01-01 | End: 2019-01-01 | Stop reason: HOSPADM

## 2019-01-01 RX ORDER — GUAIFENESIN 100 MG/5ML
400 SOLUTION ORAL EVERY 12 HOURS
Qty: 355 ML | Refills: 0 | Status: SHIPPED | OUTPATIENT
Start: 2019-01-01

## 2019-01-01 RX ORDER — GLYCOPYRROLATE 0.2 MG/ML
0.2 INJECTION INTRAMUSCULAR; INTRAVENOUS
Status: DISCONTINUED | OUTPATIENT
Start: 2019-01-01 | End: 2019-01-01 | Stop reason: HOSPADM

## 2019-01-01 RX ORDER — SODIUM CHLORIDE 9 MG/ML
10 INJECTION, SOLUTION INTRAVENOUS CONTINUOUS
Status: DISCONTINUED | OUTPATIENT
Start: 2019-01-01 | End: 2019-01-01 | Stop reason: HOSPADM

## 2019-01-01 RX ORDER — ACETAMINOPHEN 325 MG/1
650 TABLET ORAL
Qty: 90 TAB | Refills: 0 | Status: SHIPPED | OUTPATIENT
Start: 2019-01-01

## 2019-01-01 RX ORDER — GUAIFENESIN 100 MG/5ML
400 SOLUTION ORAL EVERY 12 HOURS
Status: DISCONTINUED | OUTPATIENT
Start: 2019-01-01 | End: 2019-01-01 | Stop reason: HOSPADM

## 2019-01-01 RX ORDER — FUROSEMIDE 10 MG/ML
40 INJECTION INTRAMUSCULAR; INTRAVENOUS ONCE
Status: COMPLETED | OUTPATIENT
Start: 2019-01-01 | End: 2019-01-01

## 2019-01-01 RX ADMIN — SODIUM CHLORIDE 150 ML/HR: 900 INJECTION, SOLUTION INTRAVENOUS at 01:39

## 2019-01-01 RX ADMIN — Medication 10 ML: at 10:04

## 2019-01-01 RX ADMIN — ACETAMINOPHEN 650 MG: 325 TABLET ORAL at 21:14

## 2019-01-01 RX ADMIN — SODIUM CHLORIDE 500 ML: 900 INJECTION, SOLUTION INTRAVENOUS at 02:09

## 2019-01-01 RX ADMIN — METHYLPREDNISOLONE SODIUM SUCCINATE 40 MG: 40 INJECTION, POWDER, FOR SOLUTION INTRAMUSCULAR; INTRAVENOUS at 15:15

## 2019-01-01 RX ADMIN — FAMOTIDINE 20 MG: 20 TABLET ORAL at 08:34

## 2019-01-01 RX ADMIN — Medication 10 ML: at 06:26

## 2019-01-01 RX ADMIN — METOPROLOL TARTRATE 5 MG: 1 INJECTION, SOLUTION INTRAVENOUS at 03:07

## 2019-01-01 RX ADMIN — Medication 10 ML: at 14:30

## 2019-01-01 RX ADMIN — LIDOCAINE HYDROCHLORIDE 10 ML: 10 INJECTION, SOLUTION EPIDURAL; INFILTRATION; INTRACAUDAL; PERINEURAL at 14:00

## 2019-01-01 RX ADMIN — MORPHINE SULFATE 4 MG: 2 INJECTION, SOLUTION INTRAMUSCULAR; INTRAVENOUS at 09:17

## 2019-01-01 RX ADMIN — LORAZEPAM 1 MG: 2 INJECTION INTRAMUSCULAR; INTRAVENOUS at 12:21

## 2019-01-01 RX ADMIN — HYDROMORPHONE HYDROCHLORIDE 0.5 MG: 1 INJECTION, SOLUTION INTRAMUSCULAR; INTRAVENOUS; SUBCUTANEOUS at 10:03

## 2019-01-01 RX ADMIN — SODIUM CHLORIDE 100 ML/HR: 900 INJECTION, SOLUTION INTRAVENOUS at 19:20

## 2019-01-01 RX ADMIN — LORAZEPAM 0.5 MG: 2 INJECTION INTRAMUSCULAR; INTRAVENOUS at 22:24

## 2019-01-01 RX ADMIN — ACETAMINOPHEN 650 MG: 325 TABLET ORAL at 21:46

## 2019-01-01 RX ADMIN — ALBUTEROL SULFATE 1 DOSE: 2.5 SOLUTION RESPIRATORY (INHALATION) at 20:16

## 2019-01-01 RX ADMIN — LORAZEPAM 1 MG: 2 INJECTION INTRAMUSCULAR; INTRAVENOUS at 09:17

## 2019-01-01 RX ADMIN — MORPHINE SULFATE 2 MG: 2 INJECTION, SOLUTION INTRAMUSCULAR; INTRAVENOUS at 14:29

## 2019-01-01 RX ADMIN — SENNOSIDES AND DOCUSATE SODIUM 1 TABLET: 8.6; 5 TABLET ORAL at 21:45

## 2019-01-01 RX ADMIN — GUAIFENESIN 600 MG: 600 TABLET, EXTENDED RELEASE ORAL at 12:20

## 2019-01-01 RX ADMIN — IPRATROPIUM BROMIDE AND ALBUTEROL SULFATE 3 ML: .5; 3 SOLUTION RESPIRATORY (INHALATION) at 01:30

## 2019-01-01 RX ADMIN — ALPRAZOLAM 0.25 MG: 0.25 TABLET ORAL at 10:17

## 2019-01-01 RX ADMIN — SODIUM CHLORIDE 1000 ML: 900 INJECTION, SOLUTION INTRAVENOUS at 17:52

## 2019-01-01 RX ADMIN — PREDNISONE 20 MG: 20 TABLET ORAL at 12:21

## 2019-01-01 RX ADMIN — SODIUM CHLORIDE 100 ML/HR: 900 INJECTION, SOLUTION INTRAVENOUS at 19:24

## 2019-01-01 RX ADMIN — ALPRAZOLAM 0.25 MG: 0.25 TABLET ORAL at 08:34

## 2019-01-01 RX ADMIN — SODIUM CHLORIDE 150 ML/HR: 900 INJECTION, SOLUTION INTRAVENOUS at 14:31

## 2019-01-01 RX ADMIN — FUROSEMIDE 40 MG: 10 INJECTION, SOLUTION INTRAMUSCULAR; INTRAVENOUS at 21:31

## 2019-01-01 RX ADMIN — ALBUTEROL SULFATE 1 DOSE: 2.5 SOLUTION RESPIRATORY (INHALATION) at 19:59

## 2019-01-01 RX ADMIN — PREDNISONE 20 MG: 20 TABLET ORAL at 08:34

## 2019-01-01 RX ADMIN — HYDROMORPHONE HYDROCHLORIDE 1 MG: 1 INJECTION, SOLUTION INTRAMUSCULAR; INTRAVENOUS; SUBCUTANEOUS at 15:12

## 2019-01-01 RX ADMIN — IPRATROPIUM BROMIDE AND ALBUTEROL SULFATE 3 ML: .5; 3 SOLUTION RESPIRATORY (INHALATION) at 07:11

## 2019-01-01 RX ADMIN — ACETAMINOPHEN 650 MG: 325 TABLET ORAL at 10:19

## 2019-01-01 RX ADMIN — Medication 300 UNITS: at 10:11

## 2019-01-01 RX ADMIN — SODIUM BICARBONATE 2 ML: 0.2 INJECTION, SOLUTION INTRAVENOUS at 14:00

## 2019-01-01 RX ADMIN — SODIUM CHLORIDE 500 ML: 900 INJECTION, SOLUTION INTRAVENOUS at 01:11

## 2019-01-01 RX ADMIN — MORPHINE SULFATE 15 MG: 15 TABLET ORAL at 23:42

## 2019-01-01 RX ADMIN — LORAZEPAM 0.5 MG: 2 INJECTION INTRAMUSCULAR; INTRAVENOUS at 03:58

## 2019-01-01 RX ADMIN — ALPRAZOLAM 0.25 MG: 0.25 TABLET ORAL at 17:42

## 2019-01-01 RX ADMIN — GUAIFENESIN 400 MG: 200 SOLUTION ORAL at 08:49

## 2019-01-01 RX ADMIN — FAMOTIDINE 20 MG: 20 TABLET ORAL at 17:42

## 2019-01-01 RX ADMIN — ACETAMINOPHEN 650 MG: 325 TABLET ORAL at 01:11

## 2019-01-01 RX ADMIN — GUAIFENESIN 600 MG: 600 TABLET, EXTENDED RELEASE ORAL at 21:45

## 2019-01-01 RX ADMIN — LEVOFLOXACIN 500 MG: 5 INJECTION, SOLUTION INTRAVENOUS at 01:47

## 2019-01-01 RX ADMIN — SODIUM BICARBONATE 84 MG: 42 INJECTION, SOLUTION INTRAVENOUS at 13:49

## 2019-02-19 PROBLEM — R06.02 SHORTNESS OF BREATH: Status: ACTIVE | Noted: 2019-01-01

## 2019-02-19 NOTE — TELEPHONE ENCOUNTER
We can see pt today   But seen by [facility] in past  Are we sure they dont want to go back to [facility]? Was seen at [facility] also  Maybe at 3:30 today?   3 p new breast is not new

## 2019-02-19 NOTE — PROGRESS NOTES
Dima Pimentel is a 66 y.o. female here today for lung cancer.    asking for patient to have direct admit to hospital.

## 2019-02-19 NOTE — PROGRESS NOTES
Per [facility] note:    Prior treatment:  Right lower lobe lobectomy - 11/20/17  Adjuvant carboplatin (~450mg)/Alimta (500mg/m2): 12/28/17 - 3/14/18   ChemoRT (carboplatin/taxol x 7 weekly cycles + 60Gy): 6/7/18 - 7/20/18  Durvalumab: 8/10/18 - 1/10/19

## 2019-02-19 NOTE — PROGRESS NOTES
[facility] [place] at [address], [address] [place], [address]: [phone]  F: [phone]    Reason for Visit:   Evangelina Jameson is a 66 y.o. female who is seen in consultation at the request of Dr. Martha Maxwell for evaluation of lung cancer. Treatment History:   [facility]  Lung [facility] at [facility]  Records pending    STAGE:  Unclear ? 4       History of Present Illness:     Pt seen today urgently per pt / ER request for chronic lung cancer. [facility] wanting [facility] care here. No records here now. Dx  at [facility] RLL surgery at thoracic surgery. Insurance sent pt to [place] [facility]. Had chemo / XRT last year. Pt did not complete durvalumab . Pt last seen at [facility] last week for a second opinion and pt did not want to go there. Also seen at [facility] recently Dr Rom Salcido and was supposed to do chemo and pt did not want to do this. Pt does not feel strong enough for chemo. Pt went to our ER for SOB and had CXR and has new pleural effusion. Pt left and did not want to be admitted. Pt and  now want to go to ER for admit and help with pleural effusion. Had URI and was on abx but could not take them. Pt is not on any meds/ cannot swallow pills. Feels dehydrated. Pt is having a hard time walking due to fatigue and SOB. Pt is afraid of having thoracentesis. Has mid central chest wall pain and taking liquid tylenol and advil for this.          Past Medical History:   Diagnosis Date    Cancer [facility])     Lung cancer, primary, with metastasis from lung to other site, right [facility])       Past Surgical History:   Procedure Laterality Date    HX TUMOR REMOVAL        Social History     Tobacco Use    Smoking status: Former Smoker     Last attempt to quit: 2017     Years since quittin.4    Smokeless tobacco: Never Used   Substance Use Topics    Alcohol use: No     Frequency: Never      No family history on file.  No current outpatient medications on file. No current facility-administered medications for this visit. No Known Allergies     Review of Systems: A complete review of systems was obtained, negative except as described above. Physical Exam:     Visit Vitals  /61   Pulse (!) 133   Resp 24   Ht 5' 2\" (1.575 m)   Wt 85 lb 12.8 oz (38.9 kg)   SpO2 97%   BMI 15.69 kg/m²     ECOG PS: 1-2   General: cachetic WF with audible breathing labored  Eyes:  anicteric sclerae  HENT: Atraumatic, OP clear  Neck: Supple  Respiratory: decreased bs right base, course bs diffusely  CV: Normal rate, regular rhythm  GI: Soft, nontender, nondistended  MS: general weakness  Skin: No rashes, ecchymoses, or petechiae. Normal temperature, turgor, and texture. Psych: Alert, oriented, appropriate affect, normal judgment/insight    Results:     Lab Results   Component Value Date/Time    WBC 10.4 02/18/2019 06:00 PM    HGB 10.6 (L) 02/18/2019 06:00 PM    HCT 35.5 02/18/2019 06:00 PM    PLATELET 865 (H) 53/85/7245 06:00 PM    MCV 82.6 02/18/2019 06:00 PM    ABS. NEUTROPHILS 8.8 (H) 02/18/2019 06:00 PM     Lab Results   Component Value Date/Time    Sodium 134 (L) 02/18/2019 06:00 PM    Potassium 4.2 02/18/2019 06:00 PM    Chloride 99 02/18/2019 06:00 PM    CO2 29 02/18/2019 06:00 PM    Glucose 112 (H) 02/18/2019 06:00 PM    BUN 13 02/18/2019 06:00 PM    Creatinine 0.74 02/18/2019 06:00 PM    GFR est AA >60 02/18/2019 06:00 PM    GFR est non-AA >60 02/18/2019 06:00 PM    Calcium 9.9 02/18/2019 06:00 PM     Lab Results   Component Value Date/Time    Bilirubin, total 0.3 02/18/2019 06:00 PM    ALT (SGPT) 14 02/18/2019 06:00 PM    AST (SGOT) 12 (L) 02/18/2019 06:00 PM    Alk. phosphatase 91 02/18/2019 06:00 PM    Protein, total 8.0 02/18/2019 06:00 PM    Albumin 2.6 (L) 02/18/2019 06:00 PM    Globulin 5.4 (H) 02/18/2019 06:00 PM         Records reviewed and summarized above. Pathology report(s) reviewed above.   Radiology report(s) reviewed above. Assessment/PLAN:     1)  Lung cancer ? Stage 4 seen by multiple sites [facility]/ [facility]/ [facility] [place]  No records here. Trying to get records. Unclear treatment history except for prior chemo ? At [facility] or at [facility]. Prior lung surgery at [facility]. ? Prior radiation / not sure where. Met with pt and  urgently today per pt request for office visit. Pt is SOB and states not doing well overall. Was in our ER yesterday and has right pleural effusion. Per pt/ , ER wanted to admit pt but pt did not want to stay. Pt and  now want to be admitted for pain/ symptom mgmt. Long discussion with pt/  today about treatment for cancer. Pt and  do not want any further chemo or radiation. [facility] wanted pt to have a feeding tube for further therapy. Would not recommend this. Unclear if pt's SOB is new or old. Unclear if related to cancer vs COPD. Discussed code status and pt/  have not discussed this before. Pt/ will consider. Will take pt to ER. Recommend palliative care after admit. Goal of care is palliative. Needs continued discussion about code status and goals of care. 2) chest wall pain. Taking liquid tyleno/ advil due to not being able to swallow. Palliative care for pain mgmt. 3) difficulty swallowing/ volume depletion. ? Due to cancer vs cachexia vs other. ? GI eval needed. IVF needed. Need records. 4) psychosocial.   supportive. Pt mood quiet and distressed. SW support today. To ER per pt /  request.  We will follow once admitted. Call if questions  I appreciate the opportunity to participate in Ms. Chrissy Reid's care.     Signed By: Laure Funez, DO

## 2019-02-19 NOTE — TELEPHONE ENCOUNTER
Per Mellissa Salter patient was called for appt and stated that was on was to hosptial.  RN returned call to patient and recd VM. Need to verify status of appointment or ED visit. Message left to return call.

## 2019-02-19 NOTE — PROGRESS NOTES
This note will not be viewable in [address] Oncology Navigator Psychosocial AssessmentReason for Assessment:   
[x]Depression  [x]Anxiety  []Caregiver [place]  []Maladaptive Coping with Serious Illness   [x]Other: Initial assessment Patient has a documented history of anxiety and depression. Sources of Information:   
[x]Patient  []Family  []Staff  []Medical Record Advance Care Planning: No flowsheet data found. Patient does not have an advanced medical directive, and does not wish to complete one at this time. Mental Status:   
[x]Alert  []Lethargic  []Unresponsive Oriented to:  [x]Person  [x]Place  [x]Time  [x]Situation Barriers to Learning:   
[]Language  []Developmental  []Cognitive  []Altered Mental Status  []Visual/Hearing Impairment  []Unable to Read/Write  []Motivational   [x]No Barriers Identified  []Other: 
 
Relationship Status: 
[]Single  [x]  []Significant Other/Life Partner  []  []  [] Living Circumstances: 
[]Lives Alone  [x]Family/Significant Other in Household  []Roommates  []Children in the Home  []Paid Caregivers  []Assisted Living Facility/Group Home  []Skilled [address]  []Homeless  []Incarcerated  []Environmental/Care Concerns  []Other: 
 
Support System:   
[x]Strong  []Fair  []Limited Financial/Legal Concerns:   
[]Uninsured  []Limited Income/Resources  []Non-Citizen  [x]No Concerns Identified  []Financial POA:   
[]Other: 
 
Denominational/Spiritual/Existential: 
[]Strong Sense of Spirituality  []Involved in [employer] []Request  Visit  []Expressing Spiritual/Existential Angst  [x]No Concerns Identified Coping with Illness:       
 Patient: Family/Caregiver:  
Understanding and Acceptance of Illness/Prognosis  [x] [] Strong Sense of Resilience [] []  
Self Reflection [x] [] Engaged Support System [x] [] Does not Readily Discuss Illness [] [] Denial of Terminal Status [] [] Anger [] [] Depression [] [] Anxiety/Fear [] []  
Bargaining [] [] Recent Diagnosis/Prognosis [] [] Difficulties with Body Image [] [] Loss of Identity [] [] Excessive Substance Use [] [] Mental Health History [] []  
Enmeshed Relationships [] [] History of Loss [] [] Anticipatory Grief [] [] Concern for Complicated Grief [] [] Suicidal Ideation or Plan [] [] Unable to assess [] []  
         
Narrative: Met with the patient and her  during initial visit today. Patient is being evaluated for lung cancer. The patient and her  live in [place], [place]. They have two adult children, one of whom lives in [place]. She has a PCP - Dr. Jaycob Chase. The patient explained that she has been treated with 4 rounds of chemotherapy, but did not complete the last round, as it was a year long. The patient has also had radiation. Her  explained \"We purchased an [employer] policy that turned out to be [place] includes the [facility]. We had to drive 96 miles each way to [place] to get all of her treatments. We have [place] now, so we want services here. \" Her  stated \"[facility] tried to put a feeding tube in her so that they could do more chemotherapy. We don't want anymore chemotherapy or radiation treatments. \"  The patient explained that she has been unable to swallow for the past 6-8 weeks, resulting in further weight loss. She has also been having breathing difficulties. The couple plan to return to the [facility] ER now, and the patient stated \"Maybe I need a feeding tube put in now. \"     
 
Referrals:   
 
I. Transportation Medicaid (Verna Mcclain) [] [facility] [] Regional organization  [] Financial Assistance/Medication Access Patient assistance program (Care Card) [] Co-pay assistance  [] Leukemia & Lymphoma Society [] [address]  [] Patient One Shira Villalobos [] CancerCare  [] Emotional support Peer support group [] Local counseling [] Online support group [] Coordination of psychiatry consult [] Goals/Plan:  
1. Introduced self and role of this  in the [address] 2.  Informed the patient of the [facility] and available resources there. 3.  Continue to meet with the patient when she returns to the clinic for ongoing assessment of the patients adjustment to her diagnosis and treatment. 4.  Ongoing psychosocial support as desired by patient.    
LYUDMILA Manrique

## 2019-02-19 NOTE — ED NOTES
and patient prefer not to have another EKG and chest x-ray since just seen here last night and had them done. Talked to Dr. Corinne Care and stated to get CBC and BMP so we can admit.

## 2019-02-19 NOTE — TELEPHONE ENCOUNTER
Patients  called and stated that for him to get the records it can take up to 10 days and this is a urgent matter if we can fax over a request.    Fax # [phone]

## 2019-02-19 NOTE — PROGRESS NOTES
Needs records from [address] [place], [place] [address]e.  Need records from [facility]

## 2019-02-19 NOTE — PROGRESS NOTES
[facility] records are in [facility]. To GUALBERTO for Rajesh records.

## 2019-02-19 NOTE — LETTER
2/19/2019 4:29 PM 
 
Patient:  Kate Haider YOB: 1955 Date of Visit: 2/19/2019 Dear No Recipients: Thank you for referring Ms. Efrain Phoenix to me for evaluation/treatment. Below are the relevant portions of my assessment and plan of care. If you have questions, please do not hesitate to call me. I look forward to following Ms. Donald Fox along with you. Sincerely, Shyla Guillermo, DO

## 2019-02-19 NOTE — ED NOTES
Discharge instructions reviewed by provider; pt verbalized understanding of discharge and medication use. Pt is aware of medical needs, and states she wants to go home. Vitals updated, IV DC'ed and pt wheeled from ED to family car.

## 2019-02-19 NOTE — ED TRIAGE NOTES
Triage Note: patient was seen here last night for shortness of breath and weakness and received fluids. Patient was brought back down from [facility] for fluids.

## 2019-02-19 NOTE — TELEPHONE ENCOUNTER
stated patient was seen in ED 2/18/19 and that wife needed to be seen by onoclogy today for progressive shortness of breath/weight loss. Hx:  Diagnosed with lung cancer 2017 @ [facility] and underwent care with [facility]. Due to insurance change transferred care to [facility], but could not travel. RN advised that appointment available, but if patient is short of breath and having urgent symptoms needs to be seen in ED.  verbalized understanding. RN will obtain [facility] records and  will obtain additional records for provider review.  stated if patient symptoms warrant will proceed to ED. Record request faxed complete to [facility]. To [facility] for records. To provider for review.

## 2019-02-19 NOTE — ED TRIAGE NOTES
[facility], reports being seen last night for shortness of breath. Did not want to be admitted, but shortness of breath has worsened

## 2019-02-20 PROBLEM — J91.0 PLEURAL EFFUSION, MALIGNANT: Chronic | Status: ACTIVE | Noted: 2019-01-01

## 2019-02-20 PROBLEM — R64 MALIGNANT CACHEXIA (HCC): Chronic | Status: ACTIVE | Noted: 2019-01-01

## 2019-02-20 PROBLEM — R62.7 FAILURE TO THRIVE IN ADULT: Chronic | Status: ACTIVE | Noted: 2019-01-01

## 2019-02-20 PROBLEM — C34.90 LUNG CANCER (HCC): Chronic | Status: ACTIVE | Noted: 2019-01-01

## 2019-02-20 NOTE — ROUTINE PROCESS
HR down to 106, Dr. Diane Mcfarland notified. Pt c/o anxiety due to SOB. Dr. Diane Mcfarland paged and ordered Ativan 0.5mg IV.

## 2019-02-20 NOTE — PROGRESS NOTES
NUTRITION 
 
RECOMMENDATIONS:  
 
1. Await SLP evaluation before ordering a po diet 2. If pt is deemed safe for at least full liquids, please order Ensure Enlive for all meals 3. In the event pt/family choose enteral support, suggest the following, using ND tube if possible: 
 
---- Osmolite 1.5, starting at 10 ml/hr x 8 hours 
---- Increase rate by 10 ml/hr every 8 hours until at goal of 40 ml/hr 
---- Give 90 ml water flush every 4 hours 
---- Goal will provide:  1440 kcals, 60 gm pro, 1270 ml free fluid 4. Pt is at high risk for refeeding syndrome. Please monitor BMP, Phos and Mg while increasing tube feeding RD INTERVENTION:   
Food/Nutrient Delivery:  Supplements, ? Tube feeding Nutrition Education: ,   
Nutrition Counseling:   
Coordination of Care:    
ASSESSMENT:  
Reason for Assessment:  [] MD Consult   []MST Referral/ BPA   []LOS   []Follow Up   [x] Other -- seen d/t very low weight and cancer dx PMHx:  Pt is a 66year old female admitted last evening d/t worsening SOB. Met with pt and her son, and also spoke with her physician. Pt was diagnosed with lung cancer in 09/2017, at which time she stopped smoking. Pt has had very poor intake for many months, and over the past few weeks has only tolerated liquids (juice, smoothies, etc). Pt is visibly very short of breath, and has needed several doses of ativan d/t anxiety with her SOB. Pt seems to have good social support with her  and family. Pt and  have gone back and forth re: initiating tube feedings. Per MD he will enlist help from hospice/palliative care in regards to some of these decisions to be made. Pt may not be a good candidate for aggressive nutrition support, given her advanced stage of cancer (stage 4), her respiratory issues and overall prognosis; but RD is of course available to assist with tube feeding management if this is desired. Based on pt's usual weight of 52.3 kg, she has lost over 12 kg in the past many months. This equates to a 23% loss of total body weight, which is severe. Meets Criteria for Severe Chronic Malnutrition as evidenced by: 
 [x] Severe muscle wasting, loss of subcutaneous fat 
 [x] Nutritional intake of <75% of recommended intake for >1 month 
 [x] Weight loss of  >5% in 1 month, >7.5% in 3 months, >10% in 6 months, >20% in 1 year 
 [] Severe edema Nutrition History: 
Usual Appetite: Poor Diet at Home: (has only tolerated liquids for past few weeks) Vitamins/Supplements: No 
 
Current Nutritional Intake:  
Diet Order:    
Appetite: Poor PO Ability: With assist 
Documented %PO Intake: 
No data found. Avg %meal intake: Average supplements intake: Average kcal/pro intake:   
Estimated Nutrition Needs Met based on Average %meal intake: 
Calories %:   
Protein %:   
  
 
Anthropometrics:  
Last 3 Recorded Weights in this Encounter 02/19/19 2205 Weight: 39.9 kg (88 lb) Weight Source: Patient stated Height: 5' 2\" (157.5 cm), Body mass index is 16.1 kg/m². IBW : (~ 52 kg),  , Usual Body Weight: (115 pounds / 52.3 kg), Estimated Daily Nutrition Requirements: Weight Used: UBW(52.3 kg) Kcals: (~ 0046-7434 kcals/day) Based on:[address] Protein: (1.2-1.4 gm pro/kg UBW or 60-70 gm) Fluid: (~ 1 ml/kcal) GI / Oral / Respiratory Concerns Abd:  Non-tender BM:  unknown 
 ; Chew/Swallow: Difficulty (comment)(due in part to SOB);  ; 
 ;   
 
Skin Integrity: [x]Intact  []Other Edema: [x]None []Other Food Allergies: [x]None []Other Nutritionally Significant Labs & Medications: [x] Reviewed Education & Discharge Needs: 
 [x] None Identified 
 [] Identified and addressed [x] Participated in care plan, discharge planning, Interdisciplinary rounds Diet Restrictions: 
Cultural/Mandaen Preference(s): None NUTRITION DIAGNOSIS:  
 
 Underweight related to significant decrease in oral intake as evidenced by weight loss of about 12 kg over past several months Pt is at Nutrition Risk:  [x] No Nutrition Risk Identified:  [] 
 
PT GOALS:  
 
1. Pt will tolerate least restrictive po diet over the next week MONITORING & EVALUATION:  
 Food/Nutrient Intake Outcomes: Total energy intake, Oral fluids amount Physical Signs/Symptoms Outcomes: Weight/weight change Previous Nutrition Goals: 
Previous Goal Met: N/A Previous Recommendations:     
Previous Recommendations Implemented: N/A Fanny Frank, [address] [place] [address], [address]

## 2019-02-20 NOTE — HOSPICE
[facility] RN note:  Consult noted. Reviewing chart. Discussed pt with Dr Izzy Cote (hospice medical director), staff RN Lelo Portillo. Will meet with family shortly. 4:30 met with  at bedside. Pt is unresponsive but reportedly more comfortable after PRN dose of 1mg IV dilaudid and 0.5mg IV ativan.  is tearful, relying on fernanda but clearly heartbroken. Anticipating arrival of oldest son and pt's sister describes as \"not accepting, wanting anything to keep her breathing. \"  
 
Multiple family members arriving, grieving intensely.  left hurredly to care for pt's dog. He requested that I return tomorrow to talk further about hospice if appropriate. 5:30---sister Tammy Santana arrived from out of town, states she is in \"shock,\"  Unconsolably tearful. Agreeing to  support.  paged. General Record guest house request placed for family arriving from out of town. Thank you for the opportunity to care for this pt and family. Please contact hospice at [phone] with any questions or concerns.

## 2019-02-20 NOTE — PROGRESS NOTES
Will call md just arrived from ed Stat ekg ordered rt notified [phone] Sinus tach per ekg--Dr torre informed-- md requested metoprolol to be given-- patient order to be transferred to remote tele-- report given to [facility] and she voiced concerns regarding mews of 5. Informed renu nursing supervisor regarding concerns . Patient transferred with mews of 5 related to heart rate and need for metotoprolol per nursing supervisor.

## 2019-02-20 NOTE — PROGRESS NOTES
Request by Palliative NP Wilman to join her in pt's room as pt's condition had changed. Pt's , son, and daughter-in-law were tearful at bedside. Offered expressions of comfort and assurance. Pt's  spoke of pt's fernanda in God and their spiritual beliefs that bring them comfort at this time. Affirmed their fernanda and offered prayer with family at pt's bedside. Pt able to express her love for her family as her  engaged with her following our prayer. Anticipatory grief support offered to family. Another son is on his way to the hospital. Visit ended to allow family some private time at bedside. Assured them of prayers and of  availability. Pt's case discussed with Spiritual Care team regarding follow-up visit for continued family support; staff can page the [id number] as needed at [address] [place] [address]. Marian Angela, Palliative

## 2019-02-20 NOTE — ACP (ADVANCE CARE PLANNING)
Advance Care Planning Note Name: Michelle Bartlett YOB: 1955 MRN: [id number] Admission Date: 2/19/2019  6:25 PM 
 
Date of discussion: 2/20/2019 Active Diagnoses: 
 
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 
ICD-9-CM: 799.4  2/20/2019 Yes Failure to thrive in adult (Chronic) ICD-10-CM: R62.7 ICD-9-CM: 783.7  2/20/2019 Yes * (Principal) Pleural effusion, malignant (Chronic) ICD-10-CM: J91.0 ICD-9-CM: 511.81  2/20/2019 Yes Lung cancer ([facility].) (Chronic) ICD-10-CM: C34.90 ICD-9-CM: 162.9  2/20/2019 Yes Shortness of breath ICD-10-CM: R06.02 
ICD-9-CM: 786.05  2/19/2019 Yes These active diagnoses are of sufficient risk that focused discussion on advance care planning is indicated in order to allow the patient to thoughtfully consider personal goals of care, and if situations arise that prevent the ability to personally give input, to ensure appropriate representation of their personal desires for different levels and aggressiveness of care. Discussion:  
 
Persons present and participating in discussion: Alfie Freire MD, son and daughter in law Discussion: CPR/ACLS/Cardioversion/Intubation/BiPAP/Renal failure requiring dialysis Family is aware that mom has poor prognosis, but since they are discussing further care, they want mom to be full code until they discuss with palliative care team and hospice team, for potential comfort care status. Addendum: 
DW family in detail, she is declining quickly and has been made comfort care. DW palliative care DNR Time Spent:  
 
Total time spent face-to-face in education and discussion: 19 minutes.   
 
Russ Andino MD 
2/20/2019 
1:43 PM

## 2019-02-20 NOTE — PROGRESS NOTES
Responded to staff page to room 540. Family members gathered around bed at time of visit.  listened as family introduced themselves. Sister of pt shared that pt likes to plane gardens. As she mentioned that, the pt softly responded. During visit other family members arrived. Comfort tray ordered.  prayed with family and offered continued spiritual support. Advised of  Availability. Chaplains will follow as able and/or needed. Uriah Borrego,  Intern, Andrea Diaz ([id number])

## 2019-02-20 NOTE — PROGRESS NOTES
1310 - spoke with Dr. Osmel Unger reported patients mental status and heart rate of 131 to 133. Dr. Osmel Unger communicated that he is aware of patient's heartrate and performing thoracentesis may help her. Will report conversation with Dr. Aleena Martino. Patient received ativan at [phone] and is currently resting with eyes closed, delayed responses to questions asked. Patient's  consented for procedure. 1328 - reported patients vital signs and symptoms with Dr. Aleena Martino. Dr. Aleena Martino talking with  who consented to go forward with procedure. 1345 - patient tolerated procedure well. See doc flow sheet for vital signs.  at bedside.

## 2019-02-20 NOTE — ROUTINE PROCESS
TRANSFER - OUT REPORT: 
 
Verbal report given to Mara Colorado (name) on Santosh Monroy  being transferred to [address] [place] [address] (unit) for routine progression of care Report consisted of patients Situation, Background, Assessment and  
Recommendations(SBAR). Information from the following report(s) SBAR, ED Summary and Recent Results was reviewed with the receiving nurse. Lines:  
Venous Access Device 02/19/19 Upper chest (subclavicular area, right (Active) Central Line Being Utilized Yes 2/19/2019  8:24 PM  
Site Assessment Clean, dry, & intact 2/19/2019  8:24 PM  
Date of Last Dressing Change 02/19/19 2/19/2019  8:24 PM  
Dressing Status Clean, dry, & intact 2/19/2019  8:24 PM  
Dressing Type Disk with Chlorhexadine gluconate (CHG); Transparent 2/19/2019  8:24 PM  
  
 
Opportunity for questions and clarification was provided. Patient transported with: 
Transport

## 2019-02-20 NOTE — PROGRESS NOTES
Cm informed that there was a hospice consult. Referral sent to [facility].  
Primo Dee BSW, ACM

## 2019-02-20 NOTE — H&P
[address] HISTORY AND PHYSICAL Name:  Maria Del Rosario Trotter 
MR#:  [id number] :  1955 ACCOUNT #:  [de-identified] ADMIT DATE:  2019 PRIMARY CARE PHYSICIAN:  Shiloh Blackburn NP 
 
ONCOLOGIST:  Michel Cheney DO 
 
PRESENTING COMPLAINT:  Sent from Dr. Karlie Brannon office for admission. HISTORY OF PRESENT ILLNESS:  The patient is a 57-year-old female who has history of 
stage IV lung cancer. The patient has been treated at [facility] and [facility]. She had right 
lower lobe removed by thoracic surgery in 2017 and later on due to insurance issues, 
she was transferred to different provider. She had chemo and radiation last year. The patient did not complete her chemotherapy with durvalumab because she did not 
tolerate it. The patient was seen at the [facility] last week for second opinion. The 
patient has decided that she does not want any chemo or radiation therapy. The 
patient was seen by Dr. Ismael Dillon today who sent her for admission. It is of note 
that she came to ER last night for shortness of breath. At that time, a chest x-ray 
was done which showed new pleural effusion but the patient did not want to be 
admitted at that time, on further questioning the patient's family including  
says that they are thinking about getting the pleural effusion drained. The patient 
is currently on no medications except Motrin. In the emergency room, she has refused a breathing 
treatment. The patient is also having problem with her swallowing. According to the 
 that she can swallow fluids only. She denies having any fever or chills. PAST MEDICAL HISTORY:  Significant for metastatic lung cancer. SOCIOECONOMIC HISTORY:  The patient is former smoker. She quit on 2017. She does 
not drink. She is , lives with . CODE STATUS:  Full code. FAMILY HISTORY:  Significant for cancer in her father who also had heart disease. REVIEW OF SYSTEMS:  Negative except as mentioned in history of present illness. All 
systems were reviewed, no other positive finding was noted. CURRENT MEDICATIONS:  Include Aleve. PHYSICAL EXAMINATION: 
GENERAL:  The patient is a 82-year-old female, not in any acute respiratory distress. VITAL SIGNS:  Reveals temperature of 98.1, blood pressure is 128/77, pulse is 120, 
respiratory rate is 20, saturation 100%. HEENT:  Examination reveals pupil equally reacting to light and accommodation. NECK:  Supple. There is no lymphadenopathy or JVD. CHEST:  Examination reveals bilateral wheezing. CARDIOVASCULAR SYSTEM:  S1 and S2. Tachycardic. No murmur. No S3. 
ABDOMEN:   Examination reveals no tenderness. No guarding. No rigidity. Bowel 
sounds are active. EXTREMITIES:  No pedal edema. Decreased peripheral pulses. No cyanosis or clubbing. CNS:  Examination reveals the patient is alert and oriented, has normal strength and 
normal reflexes. Plantar is downgoing. Cranial nerves are normal. 
PSYCH EXAMINATION:  The patient looks anxious. LABORATORY DATA:  Her lab work done in ER revealed a white count of 11.7, hemoglobin 
of 10.9, hematocrit 36, MCV 82.4, platelet count is 521,418, 84% segs, 6% lymphs. Chemistry reveals sodium 134, potassium is 4.2, chloride is 99, bicarb is 20, anion 
gap of 6, glucose 112, BUN of 13, creatinine 0.74, calcium is 9.9. Bilirubin 0.3, 
protein is 8, albumin 2.6, globulin 5.4. ALT is 14, AST 12, alk phos is 91. Urinalysis revealed epithelial cells and 5 to 20 wbc's. Imaging included chest x-ray which was done yesterday shows edematous right 
hemithoracic volume, moderate size right pleural effusion, 2.3 cm posterior right and 
mid pulmonary mass, and right paratracheal mediastinal thickening. ASSESSMENT AND PLAN:   
 
The patient is a 82-year-old female who has metastatic lung 
cancer who has been seen at [facility] and [facility] along with PHYSICIAN'S [facility], [facility], is being admitted for; 1. Shortness of breath. However, the patient refused a breathing treatment. She is 
not clear if she wants her pleural effusion drained initially refused it but now says she wants fluid drained. I will consult Dr. Sarah Almeida to discuss it further. We will put a consult for IR. Probably she has malignant 
pleural effusion. 2.  We will consult palliative care. 3.  Keep her n.p.o. as the patient cannot swallow. We will ask Speech to evaluate. 4.  Malnutrition. The patient is not a good candidate for a feeding tube at this 
time. We will let Oncology discuss further. 5.  We will start empirically on antibiotics. 6.  Obtain influenza screening. 7.  The patient seems anxious and may need psychosocial support. 8.  Deep venous thrombosis prophylaxis. 9. One dose of IV Lasix in ER. MD MANNY [email or web address] 
D:  02/19/2019 19:56 
T:  02/20/2019 6:12 
JOB #:  [id number]

## 2019-02-20 NOTE — PROGRESS NOTES
Relayed pt VS to Dr. Ant Dyer. Orders given to give 5mg Lopressor and monitor BP. Will call MD for new orders once VS rechecked after admiistration.

## 2019-02-20 NOTE — PROGRESS NOTES
Nurse receiving pt was concern about the muse score and the pt's condition. Charge nurse spoked to nursing supervisor (Kary) and expressed concerns. Charge nurse was told by nursing supervisor the doctor gave orders for pt to be transfered to the unit and pt was already being treated. Supervisor insisted pt needed to be transferred. 8731- Pt arrived on the unit. Muse score is 6. On call doctor will be paged. Cirilo Vuong called back. Orders to hold lopressor was given due to low blood pressure. Administer 500ml bolus and start pt on IV fluids at 150ml/hr. Dr. Armando Vuong gave orders to wait until pt HR was stable before going down for CTA.

## 2019-02-20 NOTE — PROGRESS NOTES
02/20/19 2779 Vital Signs Temp 98.6 °F (37 °C) Temp Source Pulmonary Artery Pulse (Heart Rate) (!) 135 Heart Rate Source Monitor Resp Rate 26  
O2 Sat (%) 95 % Level of Consciousness Alert /80 MAP (Calculated) 99 BP 1 Method Automatic  
BP 1 Location Left arm BP Patient Position Sitting MEWS Score 5 Pain 1 Pain Scale 1 Numeric (0 - 10) Pain Intensity 1 0 Oxygen Therapy O2 Device Room air Patient Observation Patient Turned Turns self Activity In bed;Family/Visitors present Pt in panic attack,notified MD and order received for Ativan I mg IV Stat.

## 2019-02-20 NOTE — PROGRESS NOTES
Hospitalist Progress Note Romel Ortiz MD 
Answering service: [phone] OR [phone] from in house phone Date of Service:  2019 NAME:  Kate Haider :  1955 MRN:  [id number] PCP: Ferdinand Rothman NP Chief Complaint:  
Chief Complaint Patient presents with  Shortness of Breath Admission Summary:  
 
Kate Haider is a 66 y.o. female who presented with SOB Interval history / Subjective:  
Patient seen for Follow up of SOB/metastatic lung ca First encounter Patient seen and examined by the bedside Labs, images and notes reviewed Patient is  Very anxious, SOB and tachycardiac, cannot speak in sentences and has dry mouth. DW son by the bedside, patient has been through multiple chemotherapies and now she does not want any more treatments and wants to be comfortable but still not sure about her goals of care as they think about hospice. Patient was very anxious and tearful, requiring Ativan to calm down. afebrile Denies any chest pain No fevers or chills Reports poor oral intake Discussed with nursing staff,  orders reviewed. Assessment & Plan: SOB with hypoxia, POA Multifactorial, including pleural effusion, lung ca and COPD Refusing CT chest, breathing treatments Probably has malignant pleural effusion Questionable pneumonia, cont Levaquin Follow cx Thoracentesis today, will send fluid for analysis Advanced metastatic lung ca Likely stage IV Patient is not in our system, waiting for records from outside facility Onc consult reviewed, poor prognosis DW Palliative care team, DW family and patient by the bedside, patient is very high risk and has poor prognosis, family is leaning towards hospice Cachexia with severe protein calorie malnourishment, POA Likely due to malignancy and poor oral intake SLP and Nutritionist on board I would be reluctant to place PEG or even a Dobbhoff since I am not sure if she would be able to tolerate the procedure as she is high risk for decompensation Anxiety Severe Ativan PRN Tachycardia Cont IVF Pt refusing CTPE Will avoid rate control given low BP Hypokalemia Replete Repeat labs in am 
 
 
Code status: Full Code for now DVT prophylaxis: Lovenox Care Plan discussed with: Patient/Family, Nurse and  Disposition: TBD Hospital Problems  Date Reviewed: 2019 Codes Class Noted POA * (Principal) Shortness of breath ICD-10-CM: R06.02 
ICD-9-CM: 786.05  2019 Unknown Review of Systems:  
Pertinent items are noted in HPI. Physical Examination:  
 
  General appearance: fatigued, distracted, severe distress, appears older than stated age, cachectic Throat: dry oral mucosa Lungs: coarse breath sounds, likely URT congestion, tachypnea Heart: sinus tachycardia Abdomen: soft, NT, NG Extremities: no edema, emaciated Neurologic: Grossly normal 
  
 
Vital Signs:  
 Last 24hrs VS reviewed since prior progress note. Most recent are: 
 
Visit Vitals /74 (BP Patient Position: At rest;Lying left side) Pulse (!) 131 Temp 98.5 °F (36.9 °C) Resp (!) 40 Ht 5' 2\" (1.575 m) Wt 39.9 kg (88 lb) SpO2 91% BMI 16.10 kg/m² Intake/Output Summary (Last 24 hours) at 2019 1331 Last data filed at 2019 1925 Gross per 24 hour Intake 1.67 ml Output  Net 1.67 ml Tmax:  Temp (24hrs), Av.2 °F (36.8 °C), Min:97 °F (36.1 °C), Max:98.9 °F (37.2 °C) Data Review: Xr Chest Pa Lat Result Date: 2019 INDICATION: Follow-up abnormal chest radiograph COMPARISON: 2019 FINDINGS: PA and lateral views of the chest demonstrate a stable cardiomediastinal silhouette. Right internal jugular Port-A-Cath is unchanged in position. Moderately large right pleural effusion and right basilar airspace disease are not significantly changed.  Previously seen nodular density in the superior segment of the right lower lobe is not significantly changed. The visualized osseous structures are unremarkable. IMPRESSION: Unchanged moderately large right pleural effusion, right basilar airspace disease, and right lower lobe pulmonary nodule. Xr Chest Pa Lat Result Date: 2/18/2019 EXAM: XR CHEST PA LAT INDICATION: SOB COMPARISON: None. FINDINGS: PA and lateral radiographs of the chest demonstrate a moderate size right pleural effusion. No left pleural effusion is shown. There is diminished volume of the right hemithorax with patchy atelectasis in the right middle lobe and right lower lobe. The left lung appears clear. There is a nodular density shown posteriorly in the right midlung overlying the right hilum measuring 2.4 cm in size. Right paratracheal thickening is also shown. There is no pneumothorax. Cardiac size is within normal limits. The bones are mildly osteopenic. A right IJ portacatheter is shown terminating at the cavoatrial junction. IMPRESSION: Diminished right hemithoracic volume with moderate right pleural effusion, 2.3 cm posterior right mid pulmonary mass, and right paratracheal mediastinal thickening. No results found for: SDES No results found for: CULT All Micro Results Procedure Component Value Units Date/Time CULTURE, BODY FLUID W [address]organ[address] [[id number]] Order Status:  Sent Specimen:  Pleural Fluid CULTURE, RESPIRATORY/SPUTUM/BRONCH Aarti Gunner STAIN [[id number]] Order Status:  Sent Specimen:  Sputum INFLUENZA A & B AG (RAPID TEST) [[id number]] Collected:  02/19/19 2130 Order Status:  Completed Specimen:  Nasopharyngeal from Nasal washing Updated:  02/19/19 2159 Influenza A Antigen NEGATIVE Influenza B Antigen NEGATIVE Labs:  
 
Recent Labs  
  02/20/19 0217 02/20/19 
0028 WBC 9.9 10.3 HGB 9.9* 10.2* HCT 32.6* 33.3*  
* 566* Recent Labs  
  02/20/19 0217 02/20/19 0028 02/19/19 
2040  137 140  
K 3.2* 3.2* 3.7  98 103 CO2 27 27 27 BUN 10 11 11 CREA 0.57 0.55 0.51* * 109* 107* CA 8.9 8.9 9.3 Recent Labs  
  02/18/19 
1800 SGOT 12* ALT 14  
AP 91 TBILI 0.3 TP 8.0 ALB 2.6*  
GLOB 5.4* Recent Labs  
  02/20/19 
1021 INR 1.2* PTP 12.3* No results for input(s): FE, TIBC, PSAT, FERR in the last 72 hours. No results found for: FOL, RBCF No results for input(s): PH, PCO2, PO2 in the last 72 hours. Recent Labs  
  02/20/19 
0028 CPK 29 CKNDX Cannot be calculated TROIQ <0.05 No results found for: CHOL, CHOLX, CHLST, CHOLV, HDL, LDL, LDLC, DLDLP, TGLX, TRIGL, TRIGP, CHHD, CHHDX No results found for: Lisa Willard Lab Results Component Value Date/Time Color YELLOW/STRAW 02/18/2019 06:47 PM  
 Appearance HAZY (A) 02/18/2019 06:47 PM  
 Specific gravity 1.020 02/18/2019 06:47 PM  
 pH (UA) 6.5 02/18/2019 06:47 PM  
 Protein TRACE (A) 02/18/2019 06:47 PM  
 Glucose NEGATIVE  02/18/2019 06:47 PM  
 Ketone 15 (A) 02/18/2019 06:47 PM  
 Bilirubin NEGATIVE  02/18/2019 06:47 PM  
 Urobilinogen 0.2 02/18/2019 06:47 PM  
 Nitrites NEGATIVE  02/18/2019 06:47 PM  
 Leukocyte Esterase TRACE (A) 02/18/2019 06:47 PM  
 Epithelial cells MANY (A) 02/18/2019 06:47 PM  
 Bacteria 1+ (A) 02/18/2019 06:47 PM  
 WBC 5-10 02/18/2019 06:47 PM  
 RBC 0-5 02/18/2019 06:47 PM  
 
 
 
Medications Reviewed:  
 
Current Facility-Administered Medications Medication Dose Route Frequency  LORazepam (ATIVAN) 2 mg/mL injection  LORazepam (ATIVAN) injection 1 mg  1 mg IntraVENous Q4H PRN  
 0.9% sodium chloride infusion  150 mL/hr IntraVENous CONTINUOUS  
 sodium bicarbonate (4.2%) injection 84 mg  2 mL SubCUTAneous RAD ONCE  
 methylPREDNISolone (PF) (SOLU-MEDROL) injection 40 mg  40 mg IntraVENous Q8H  potassium chloride SR (KLOR-CON 10) tablet 40 mEq  40 mEq Oral NOW  
  sodium chloride (NS) flush 5-40 mL  5-40 mL IntraVENous Q8H  
 sodium chloride (NS) flush 5-40 mL  5-40 mL IntraVENous PRN  
 enoxaparin (LOVENOX) injection 40 mg  40 mg SubCUTAneous Q24H  
 albuterol-ipratropium (DUO-NEB) 2.5 MG-0.5 MG/3 ML  3 mL Nebulization Q6H RT  
 levoFLOXacin (LEVAQUIN) 500 mg in D5W IVPB  500 mg IntraVENous Q24H  
 
______________________________________________________________________ EXPECTED LENGTH OF STAY: 2d 9h 
ACTUAL LENGTH OF STAY:          1 Russ Andino MD

## 2019-02-20 NOTE — ED PROVIDER NOTES
66 y.o. female with past medical history significant for lung cancer who presents from Oncologist office with chief complaint of SOB. Pt states taht she has been experiencing worsening SOB and was seen in the ED yesterday for the same sx where she was offered admission but declined. She f/u with Oncology at [facility] today who referred her back to the ED for admission. Pt states that she has been becoming \"winded\" when transferring from the bed to a wheelchair. Per , the pt has not been eating or drinking properly at home. She also states that she is having difficulty swallowing large amounts of food or liquids. Pt denies fever or chills. There are no other acute medical concerns at this time. Chart Review:  Pt was seen in the ED 2/18/19 for SOB. She was expresiving progressively worsening rhonchorous berathing over the past few weeks. She was offered admission but declined and wished to f/u with Oncology at [facility] as an outpatient. She f/u with Dr. Laney Nicholson today in the office who sent her to the ED for admission and palliative care. Social hx: former tobacco smoker; no EtOH use PCP: Caroline Ibarra NP Note written by Fatimah Rivera, as dictated by Gillian Brizuela MD 7:08 PM 
 
 
The history is provided by the patient. No  was used. Past Medical History:  
Diagnosis Date  Cancer ([facility] [address]  Lung cancer, primary, with metastasis from lung to other site, right ([address] Past Surgical History:  
Procedure Laterality Date  HX TUMOR REMOVAL History reviewed. No pertinent family history. Social History Socioeconomic History  Marital status:  Spouse name: Not on file  Number of children: Not on file  Years of education: Not on file  Highest education level: Not on file Social Needs  Financial resource strain: Not on file  Food insecurity - worry: Not on file  Food insecurity - inability: Not on file  Transportation needs - medical: Not on file  Transportation needs - non-medical: Not on file Occupational History  Not on file Tobacco Use  Smoking status: Former Smoker Last attempt to quit: 2017 Years since quittin.4  Smokeless tobacco: Never Used Substance and Sexual Activity  Alcohol use: No  
  Frequency: Never  Drug use: No  
 Sexual activity: Not on file Other Topics Concern  Not on file Social History Narrative  Not on file ALLERGIES: Patient has no known allergies. Review of Systems Constitutional: Negative for chills and fever. Respiratory: Positive for shortness of breath. Cardiovascular: Positive for chest pain. Gastrointestinal: Negative for abdominal pain, diarrhea and vomiting. Skin: Negative for rash. All other systems reviewed and are negative. Vitals:  
 19 1636 19 1735 BP:  128/77 Pulse: (!) 133 (!) 134 Resp:  20 Temp:  98.1 °F (36.7 °C) SpO2: 98% 100% Weight:  39.9 kg (88 lb) Height:  5' 2\" (1.575 m) Physical Exam  
Constitutional: She appears well-developed and well-nourished. No distress. HENT:  
Head: Normocephalic and atraumatic. Eyes: Conjunctivae are normal.  
Neck: Neck supple. Cardiovascular: Normal rate and regular rhythm. Pulmonary/Chest: Effort normal. No respiratory distress. She has wheezes (bilateral fine expiratory wheezes). Abdominal: She exhibits no distension. Musculoskeletal: Normal range of motion. She exhibits no deformity. Neurological: She is alert. No cranial nerve deficit. Skin: Skin is warm and dry. Psychiatric: Her behavior is normal.  
Nursing note and vitals reviewed. Note written by Fatimah Beal, as dictated by Ana Maria Rogers MD 7:08 PM  
 
MDM 
  
66 y.o. female presents for palliative care admission.  Was offered admission yesterday for breathing difficulty and lack of PO intake but declined, was seen in oncology clinica and recommended to return to ED for admission. Hospitalist was consulted for admission and will see the patient in the emergency department. Procedures Hospitalist [place] for Admission 7:17 PM 
 
ED Room Number: ER07/07 Patient Name and age:  Bala Chawla 66 y.o.  female Working Diagnosis: 1. Stage 4 malignant neoplasm of lung, unspecified laterality ([address] 2. SOB (shortness of breath) 3. Encounter for palliative care Readmission: no 
Isolation Requirements:  no 
Recommended Level of Care:  med/surg Code Status:  DNR PROGRESS NOTE: 
7:20 PM 
Discussed end of life goals with patient and  as well as the possibility of cardiac or respiratory arrest. Pt wishes to be DNR status at this time. CONSULT NOTE: 
7:29 PM Farzad Hallman MD spoke with Dr. Fito Koo, Consult for Hospitalist.  Discussed available diagnostic tests and clinical findings. Dr. Fito Koo will admit the pt.  
 
7:29 PM 
Patient is being admitted to the hospital.  The results of their tests and reasons for their admission have been discussed with them and/or available family. They convey agreement and understanding for the need to be admitted and for their admission diagnosis. Consultation will be made now with the inpatient physician for hospitalization.

## 2019-02-20 NOTE — PROGRESS NOTES
Admission Medication Reconciliation: 
 
Information obtained from: patient Significant PMH/Disease States:  
Past Medical History:  
Diagnosis Date  Cancer ([facility] [address]  Lung cancer, primary, with [facility] from lung to other site, right [facility] Chief Complaint for this Admission:  SOB Allergies:  Patient has no known allergies. Prior to Admission Medications:  
None Comments/Recommendations: Patient denies regularly taking any prescription or non-prescription medications prior to admission.

## 2019-02-20 NOTE — PROGRESS NOTES
02/20/19 1415 Vital Signs Temp 98.4 °F (36.9 °C) Temp Source Oral  
Pulse (Heart Rate) (!) 141 Heart Rate Source Monitor Resp Rate (!) 40  
O2 Sat (%) 93 % Level of Consciousness Alert  
BP (!) 163/118 MAP (Calculated) 133 BP 1 Method Doppler BP 1 Location Left arm BP Patient Position At rest  
MEWS Score 6 Notified MD.Order received for Morphine 2 mg IV Once,ABG and CXR Stat.

## 2019-02-20 NOTE — CONSULTS
Palliative Medicine Consult [place]: [phone]) Patient Name: Clover Valera YOB: 1955 Date of Initial Consult: February 20, 2019 Reason for Consult: Care Decisions Requesting Provider:  Dr. Michelle Rhodes and Dr. Genna Elam Primary Care Physician: Christian Ye, ABDIRAHMAN 
 
 SUMMARY:  
Clover Valera is a 66 y.o. female with a past history of advance lung cancer diagnosed in Sept 2017, who was admitted on 2/19/2019 from oncologist's office with a diagnosis of shortness of breath with hypoxia, cachexia with severe protien calorie malnutrition, anxiety, tachycardia, hypokalemia. The pt has been seen for her cancer at [facility], [facility], [address] [place] [address], and [address] [place] [address]. Intolerable side effects reported from spouse. Pt recently made decision that she no longer wanted any more chemo or radiation. Admitted with new rt pleural effusion Current medical issues leading to Palliative Medicine involvement include: support with care goals given poor prognosis and overwhelming symptoms Social:  47 years, 2 sons, born and raised in [place], [id number] [place] [address],[address], homemaker, loved photography and cooking, enjoyed family time playing board games PALLIATIVE DIAGNOSES:  
1. Shortness of breath 2. Agitation 3. DNR discussion 4. Dying care PLAN:  
1. Met with the patient's family (son, spouse, dil) 2. Pt restless, wheezing, labored breathing, and hypotensive 3. Explained to the spouse that the pt is full code which means a team would come in here and initiate interventions that will lead to more suffering 4. Explained that the pt appears to be dying and aggressive intervention likely to prolong the dying process. Offered the option to focus on comfort exclusively allowing her to die peacefully. 5. Explained that we would change code status to DNR and administer medications based on her symptoms.   Explained that we will stop labs and other diagnostics including artificially prolonging measures allow her to pass naturally 6. Pastoral care providing spiritual support 7. Results of family meetin. Spouse and son agree with DNR and understand she has rapidly declined and is at end of life 2. Dilaudid 1mg iv test dose ordered and tolerated well. Order placed for q 30min dosing 3. Ativan 1mg changed to q 1 hour prn 4. Comfort orders placed 8. Initial consult note routed to primary continuity provider and/or primary health care team members 9. Communicated plan of care with: Palliative IDT, Melinda 192 Team 
 
 GOALS OF CARE / TREATMENT PREFERENCES:  
 
GOALS OF CARE: 
Patient/Health Care Proxy Stated Goals: Comfort TREATMENT PREFERENCES:  
Code Status: DNR Advance Care Planning: 
[x] The [facility] Interdisciplinary Team has updated the ACP Navigator with Behzad and Patient Capacity Primary Decision Maker ([address]): spouse Medical Interventions: Comfort measures Other Instructions:  
Artificially Administered Nutrition: No feeding tube Other: As far as possible, the palliative care team has discussed with patient / health care proxy about goals of care / treatment preferences for patient. HISTORY:  
 
History obtained from:  chart CHIEF COMPLAINT:  Pt admitted for aforementioned issues HPI/SUBJECTIVE: The patient is:  
[] Verbal and participatory [x] Non-participatory due to:  
Agitated, short of breath Clinical Pain Assessment (nonverbal scale for severity on nonverbal patients): Activity (Movement): Restless, excessive activity and/or withdrawal reflexes Duration: for how long has pt been experiencing pain (e.g., 2 days, 1 month, years) Frequency: how often pain is an issue (e.g., several times per day, once every few days, constant) FUNCTIONAL ASSESSMENT:  
 
Palliative Performance Scale (PPS): PPS: 20 
 
 
 PSYCHOSOCIAL/SPIRITUAL SCREENING:  
 
Palliative IDT has assessed this patient for cultural preferences / practices and a referral made as appropriate to needs (Cultural Services, Patient Advocacy, Ethics, etc.) Any spiritual / Cheondoism concerns: 
[] Yes /  [x] No 
 
Caregiver Burnout: 
[] Yes /  [x] No /  [] No Caregiver Present Anticipatory grief assessment:  
[x] Normal  / [] Maladaptive ESAS Anxiety: ESAS Depression:    
 
 
 REVIEW OF SYSTEMS:  
 
Positive and pertinent negative findings in ROS are noted above in HPI. The following systems were [x] reviewed / [x] unable to be reviewed as noted in HPI Other findings are noted below. Systems: constitutional, ears/nose/mouth/throat, respiratory, gastrointestinal, genitourinary, musculoskeletal, integumentary, neurologic, psychiatric, endocrine. Positive findings noted below. Modified ESAS Completed by: provider PHYSICAL EXAM:  
 
From RN flowsheet: 
Wt Readings from Last 3 Encounters:  
02/19/19 88 lb (39.9 kg) 02/19/19 85 lb 12.8 oz (38.9 kg) 02/18/19 88 lb (39.9 kg) Blood pressure (!) 79/64, pulse (!) 125, temperature 98.4 °F (36.9 °C), resp. rate 22, height 5' 2\" (1.575 m), weight 88 lb (39.9 kg), SpO2 99 %. Pain Scale 1: Visual 
Pain Intensity 1: 0 Last bowel movement, if known:  
 
Constitutional: restless, thin, frail, non verbal 
Eyes: pupils equal, anicteric ENMT: no nasal discharge, moist mucous membranes Cardiovascular: regular rhythm, distal pulses intact Respiratory: breathing labored, wheezing, symmetric Gastrointestinal: soft non-tender, +bowel sounds Musculoskeletal: no deformity, no tenderness to palpation Skin: warm, dry Neurologic: sedated with meds Psychiatric: agitated, restless Other: 
 
 
 HISTORY:  
 
Principal Problem: 
  Pleural effusion, malignant (2/20/2019) Active Problems: 
  Shortness of breath (2/19/2019) Malignant cachexia ([facility] [address] (2/20/2019) Failure to thrive in adult (2/20/2019) Lung cancer ([facility] [address] (2/20/2019) Past Medical History:  
Diagnosis Date  Cancer ([facility] [address]  Lung cancer, primary, with metastasis from lung to other site, right ([facility] [address] Past Surgical History:  
Procedure Laterality Date  HX TUMOR REMOVAL History reviewed. No pertinent family history. History reviewed, no pertinent family history. Social History Tobacco Use  Smoking status: Former Smoker Last attempt to quit: 2017 Years since quittin.4  Smokeless tobacco: Never Used Substance Use Topics  Alcohol use: No  
  Frequency: Never No Known Allergies Current Facility-Administered Medications Medication Dose Route Frequency  methylPREDNISolone (PF) (SOLU-MEDROL) injection 40 mg  40 mg IntraVENous Q8H  
 0.9% sodium chloride infusion  10 mL/hr IntraVENous CONTINUOUS  
 furosemide (LASIX) injection 40 mg  40 mg IntraVENous ONCE  
 LORazepam (ATIVAN) injection 1 mg  1 mg IntraVENous Q15MIN PRN  
 glycopyrrolate (ROBINUL) injection 0.2 mg  0.2 mg IntraVENous Q4H PRN  
 HYDROmorphone (PF) (DILAUDID) injection 1 mg  1 mg IntraVENous Q30MIN PRN  
 sodium chloride (NS) flush 5-40 mL  5-40 mL IntraVENous PRN  
 albuterol-ipratropium (DUO-NEB) 2.5 MG-0.5 MG/3 ML  3 mL Nebulization Q6H RT  
 
 
 
 LAB AND IMAGING FINDINGS:  
 
Lab Results Component Value Date/Time WBC 9.9 2019 02:17 AM  
 HGB 9.9 (L) 2019 02:17 AM  
 PLATELET 756 (H)  02:17 AM  
 
Lab Results Component Value Date/Time Sodium 138 2019 02:17 AM  
 Potassium 3.2 (L) 2019 02:17 AM  
 Chloride 101 2019 02:17 AM  
 CO2 27 2019 02:17 AM  
 BUN 10 2019 02:17 AM  
 Creatinine 0.57 2019 02:17 AM  
 Calcium 8.9 2019 02:17 AM  
  
Lab Results Component Value Date/Time AST (SGOT) 12 (L) 2019 06:00 PM  
 Alk.  phosphatase 91 2019 06:00 PM  
 Protein, total 8.0 2019 06:00 PM  
 Albumin 2.6 (L) 2019 06:00 PM  
 Globulin 5.4 (H) 2019 06:00 PM  
 Lab Results Component Value Date/Time INR 1.2 (H) 02/20/2019 10:21 AM  
 Prothrombin time 12.3 (H) 02/20/2019 10:21 AM  
  
No results found for: IRON, FE, TIBC, IBCT, PSAT, FERR No results found for: PH, PCO2, PO2 No components found for: [place] Lab Results Component Value Date/Time CK 29 02/20/2019 12:28 AM  
 CK - MB <1.0 02/20/2019 12:28 AM  
  
 
 
   
 
Total time: 70 minutes Counseling / coordination time, spent as noted above: 55 minutes 
> 50% counseling / coordination?: y 
 
Prolonged service was provided for  []30 min   []75 min in face to face time in the presence of the patient, spent as noted above. Time Start:  
Time End:  
Note: this can only be billed with 92221 (initial) or 12389 (follow up). If multiple start / stop times, list each separately.

## 2019-02-20 NOTE — PROGRESS NOTES
Voiced concerns of patient coming to non-tele floor with a hr of 133 and a mews of a 4. Spoke with renu nursing supervisor and she will look into it. TRANSFER - IN REPORT: 
 
Verbal report received from daniel(name) on Wagner Paul  being received from ed(unit) for routine progression of care Report consisted of patients Situation, Background, Assessment and  
Recommendations(SBAR). Information from the following report(s) SBAR, Kardex, ED Summary, [facility] and Recent Results was reviewed with the receiving nurse. Opportunity for questions and clarification was provided. rapid flu will be done

## 2019-02-20 NOTE — PROGRESS NOTES
Spiritual Care Assessment/Progress Note [address] 
 
 
NAME: Janey Mitchell      MRN: [id number] AGE: 66 y.o. SEX: female Moravian Affiliation: Unknown Language: English  
 
2/20/2019     Total Time (in minutes): 10 Spiritual Assessment begun in [facility] 5W1 ORTHO SPINE through conversation with: 
  
    []Patient        [x] Family    [] Friend(s) Reason for Consult: Palliative Care, Initial/Spiritual Assessment Spiritual beliefs: (Please include comment if needed) [x] Identifies with a fernanda tradition:     
   [] Supported by a fernanda community:        
   [] Claims no spiritual orientation:       
   [] Seeking spiritual identity:            
   [] Adheres to an individual form of spirituality:       
   [] Not able to assess:                   
 
    
Identified resources for coping:  
   [] Prayer                           
   [] Music                  [] Guided Imagery [x] Family/friends                 [] Pet visits [] Devotional reading                         [] Unknown 
   [] Other:                                        
 
 
Interventions offered during this visit: (See comments for more details) Family/Friend(s): Affirmation of emotions/emotional suffering, Affirmation of fernanda, Catharsis/review of pertinent events in supportive environment, Coping skills reviewed/reinforced, Normalization of emotional/spiritual concerns Plan of Care: 
 
 [] Support spiritual and/or cultural needs  
 [] Support AMD and/or advance care planning process    
 [] Support grieving process 
 [] Coordinate Rites and/or Rituals  
 [] Coordination with community clergy 
 [x] No spiritual needs identified at this time 
 [] Detailed Plan of Care below (See Comments)  [] Make referral to Music Therapy 
[] Make referral to Pet Therapy    
[] Make referral to Addiction services 
[] Make referral to [facility] 
[] Make referral to Spiritual Care Partner [] No future visits requested       
[x] Follow up visits as needed Visited pt on 5W. Pt was not in room. However, his son and daughter-in-law were present. Brief visit. The son spoke of his mother's fear over medical tests and what they may reveal. Les Crockett will follow as needed. Chaplain Gayle MDiv, MS, [facility] 
[phone] ([phone])

## 2019-02-20 NOTE — ROUTINE PROCESS
Nurse spoke with Dr. Conchis Brooks about pt blood pressure. MD given orders to continue to hold lopressor and give another 500ml bolus of NS. Will recheck BP after bolus is complete and page MD again for further orders.

## 2019-02-20 NOTE — PROGRESS NOTES
[facility] [place] [facility] [place] [address] [address], [address] W: [phone]  F: [phone] for Visit:  
Dequan Lucero is a 66 y.o. female who is seen in consultation at the request of Dr. Pedro Hdz for evaluation of lung cancer. Treatment History:  
[facility] at [facility] Records pending STAGE:  Unclear ? 4 History of Present Illness:  
 
Pt seen today in hospital consult for lung cancer likely stage 4 with no records here. Pt seen by us for first time yesterday. Pt has been to multiple centers for cancer treatment. Unclear but think [facility] was last center of treatment. Pt admitted for SOB/ symptom mgmt. Has pleural effusion. Pt and  state that pt does not want any chemo/ radiation or aggressive treatment. Pt is in bed \"having an anxiety attack\" per . Pt still feels SOB. Has CTA ordered but pt is not sure she wants to do this. Had ativan recently per nursing. Family at bedside. Yesterday note:  
 First time seen. Pt seen today urgently in office per pt / ER request for chronic lung cancer. Pt of [facility] and [facility] wanting to transfer care here. No records here now. Dx 9/17 at [facility] RLL surgery at thoracic surgery. Insurance sent pt to [place] in [facility]. Had chemo / XRT last year. Pt did not complete durvalumab 1/19. Pt last seen at [facility] last week for a second opinion and pt did not want to go there. Also seen at [facility] recently Dr Hitesh Acosta and was supposed to do chemo and pt did not want to do this. Pt does not feel strong enough for chemo. Pt went to our ER for SOB and had CXR and has new pleural effusion. Pt left and did not want to be admitted. Pt and  now want to go to ER for admit and help with pleural effusion. Had URI and was on abx but could not take them. Pt is not on any meds/ cannot swallow pills. Feels dehydrated. Pt is having a hard time walking due to fatigue and SOB. Pt is afraid of having thoracentesis. Has mid central chest wall pain and taking liquid tylenol and advil for this. Past Medical History:  
Diagnosis Date  Cancer ([facility] [address]  Lung cancer, primary, with metastasis from lung to other site, right ([facility] [address] Past Surgical History:  
Procedure Laterality Date  HX TUMOR REMOVAL Social History Tobacco Use  Smoking status: Former Smoker Last attempt to quit: 2017 Years since quittin.4  Smokeless tobacco: Never Used Substance Use Topics  Alcohol use: No  
  Frequency: Never History reviewed. No pertinent family history. Current Facility-Administered Medications Medication Dose Route Frequency  LORazepam (ATIVAN) 2 mg/mL injection  LORazepam (ATIVAN) injection 1 mg  1 mg IntraVENous Q4H PRN  
 0.9% sodium chloride infusion  150 mL/hr IntraVENous CONTINUOUS  
 sodium bicarbonate (4.2%) injection 84 mg  2 mL SubCUTAneous RAD ONCE  
 methylPREDNISolone (PF) (SOLU-MEDROL) injection 40 mg  40 mg IntraVENous Q8H  potassium chloride SR (KLOR-CON 10) tablet 40 mEq  40 mEq Oral NOW  lidocaine (PF) (XYLOCAINE) 10 mg/mL (1 %) injection 10 mL  10 mL SubCUTAneous RAD ONCE  
 sodium chloride (NS) flush 5-40 mL  5-40 mL IntraVENous Q8H  
 sodium chloride (NS) flush 5-40 mL  5-40 mL IntraVENous PRN  
 enoxaparin (LOVENOX) injection 40 mg  40 mg SubCUTAneous Q24H  
 albuterol-ipratropium (DUO-NEB) 2.5 MG-0.5 MG/3 ML  3 mL Nebulization Q6H RT  
 levoFLOXacin (LEVAQUIN) 500 mg in D5W IVPB  500 mg IntraVENous Q24H No Known Allergies Review of Systems: A complete review of systems was obtained, negative except as described above. Physical Exam:  
 
Visit Vitals /62 (BP 1 Location: Left arm, BP Patient Position: Lying left side) Pulse (!) 131 Temp 98.5 °F (36.9 °C) Resp 23 Ht 5' 2\" (1.575 m) Wt 88 lb (39.9 kg) SpO2 98% BMI 16.10 kg/m² ECOG PS: 1-2 General: cachetic WF with audible breathing labored Eyes:  anicteric sclerae HENT: Atraumatic, OP clear Neck: Supple Respiratory: decreased bs right base, course bs diffusely CV: Normal rate, regular rhythm GI: Soft, nontender, nondistended MS: general weakness Skin: pale, warm, dry Psych: anxious, conversant Results:  
 
Lab Results Component Value Date/Time WBC 9.9 02/20/2019 02:17 AM  
 HGB 9.9 (L) 02/20/2019 02:17 AM  
 HCT 32.6 (L) 02/20/2019 02:17 AM  
 PLATELET 818 (H) 23/43/4760 02:17 AM  
 MCV 80.9 02/20/2019 02:17 AM  
 ABS. NEUTROPHILS 9.8 (H) 02/19/2019 05:38 PM  
 
Lab Results Component Value Date/Time Sodium 138 02/20/2019 02:17 AM  
 Potassium 3.2 (L) 02/20/2019 02:17 AM  
 Chloride 101 02/20/2019 02:17 AM  
 CO2 27 02/20/2019 02:17 AM  
 Glucose 119 (H) 02/20/2019 02:17 AM  
 BUN 10 02/20/2019 02:17 AM  
 Creatinine 0.57 02/20/2019 02:17 AM  
 GFR est AA >60 02/20/2019 02:17 AM  
 GFR est non-AA >60 02/20/2019 02:17 AM  
 Calcium 8.9 02/20/2019 02:17 AM  
 
Lab Results Component Value Date/Time Bilirubin, total 0.3 02/18/2019 06:00 PM  
 ALT (SGPT) 14 02/18/2019 06:00 PM  
 AST (SGOT) 12 (L) 02/18/2019 06:00 PM  
 Alk. phosphatase 91 02/18/2019 06:00 PM  
 Protein, total 8.0 02/18/2019 06:00 PM  
 Albumin 2.6 (L) 02/18/2019 06:00 PM  
 Globulin 5.4 (H) 02/18/2019 06:00 PM  
 
 
 
Records reviewed and summarized above. Pathology report(s) reviewed above. Radiology report(s) reviewed above. Assessment/PLAN:  
 
1)  Lung cancer ? Stage 4 seen by multiple sites [facility]/ [facility]/ [facility] No records here. Trying to get records. Seen yesterday for first time in office and sent to ER per pt preference. Unclear treatment history except for prior chemo ? At [facility] or at [facility]. Prior lung surgery at [facility]. ? Prior radiation / not sure where. Met with pt and  urgently yesterday per pt request for office visit. Pt is SOB and states not doing well overall. Was in our ER initially and has right pleural effusion. Per pt/ , ER wanted to admit pt but pt did not want to stay. Pt and  sent back to ER yesterday for pain/ symptom mgmt. Long discussion with pt/  today about treatment for cancer. Pt and  do not want any further chemo or radiation. State [facility] wanted pt to have a feeding tube for further therapy. Would not recommend this. Unclear if pt's SOB is new or old. Unclear if related to cancer vs COPD or both. Pt wants to feel better then go home. Recommend palliative care eval.  
Discussed palliative care and hospice. Best plan may be home hospice and pt /  will consider this. Goal of care is palliative. Needs continued discussion about code status and goals of care. 2) chest wall pain. Was taking liquid tyleno/ advil due to not being able to swallow. Palliative care for pain mgmt. 3) difficulty swallowing/ volume depletion. ? Due to cancer vs cachexia vs other. Has issues with pills. 4) psychosocial.   supportive. Pt mood quiet and distressed. Palliative care support. We will follow Call if questions I appreciate the opportunity to participate in Ms. Mariann Reid's care.  
 
Signed By: Awais Liu, DO

## 2019-02-20 NOTE — PROGRESS NOTES
Problem: Dysphagia (Adult) Goal: *Acute Goals and Plan of Care (Insert Text) Speech therapy goals Initiated 2/20/2019 1. Patient will tolerate a full liquid diet without s/s of aspiration within 7 days Speech LAnguage Pathology bedside swallow evaluation Patient: Mine Rose (89 y.o. female) Date: 2/20/2019 Primary Diagnosis: Shortness of breath [R06.02] Precautions: aspiration ASSESSMENT : 
Based on the objective data described below, the patient presents with mild pharyngeal dysphagia characterized by suspected mildly delayed swallow initiation. Hyolaryngeal elevation/excursion functional via palpation and no overt s/s of aspiration observed. Patient reports significant discomfort with swallowing with feeling of food stuck in her chest.  Also with difficulty with coordination of the apneic phase of the swallow due to increased work of breathing at rest with patient with significant audible wheezing during inhalation and exhalation. Patient reports feeling of having an airway obstruction related to her cancer and has significant discomfort holding her breath to swallow. Patient gasps for air after each swallow which does place her at increased risk for aspiration if any pharyngeal residue were present. At this point there is little to offer in regards to dysphagia management. Son reports her intake is minimal over the last month or so, and she avoids solid foods due to difficulties. Suspect esophageal component and may benefit from GI consult to assess any options for management, however, respiratory status is impaired at this time so uncertain if there are any treatment options. Suspect her intake will remain minimal and she appears to derive limited comfort from eating/drinking at this time. Patient will benefit from skilled intervention to address the above impairments. Patients rehabilitation potential is considered to be Guarded Factors which may influence rehabilitation potential include:  
[]            None noted []            Mental ability/status [x]            Medical condition []            Home/family situation and support systems 
[]            Safety awareness 
[]            Pain tolerance/management []            Other: PLAN : 
Recommendations and Planned Interventions: 
--recommend bites/sips of a full liquid diet as patient desires/as tolerated. Frequent rest breaks. meds crushed. Would avoid solids due to exertion of chewing.   
--consider GI consult if in line with family goals. Note Palliative consult pending and patient has been declining invasive testing. She was open to discussing possible GI consult with MD.  
--will follow for any further education needs, however, little for SLP to offer at this time as structurally patient only has mild dysphagia and respiratory status and suspected GI component are the major limiting factors in safe intake. Frequency/Duration: Patient will be followed by speech-language pathology 1 time a week to address goals. Discharge Recommendations: To Be Determined SUBJECTIVE:  
Patient stated it's from the cancer. It gets stuck and obstructs my breathing. When asking if food feels like it gets stuck. OBJECTIVE:  
 
Past Medical History:  
Diagnosis Date  Cancer ([facility]  Lung cancer, primary, with metastasis from lung to other site, right ([address] Past Surgical History:  
Procedure Laterality Date  HX TUMOR REMOVAL Prior Level of Function/Home Situation:  
  
Diet prior to admission: limited to mostly smoothies and soft/smooth foods Current Diet:  NPO  Cognitive and Communication Status: 
Neurologic State: Alert, Appropriate for age Orientation Level: Oriented X4 Cognition: Follows commands Perception: Appears intact Perseveration: No perseveration noted Safety/Judgement: Not assessed Oral Assessment: 
Oral Assessment Labial: No impairment Dentition: Natural 
Oral Hygiene: moist mucosa Lingual: No impairment Velum: Unable to visualize Mandible: No impairment P.O. Trials: 
Patient Position: upright in bed Vocal quality prior to P.O.: Hoarse;Breathy(significant wheezing on inspiration and exhalation) Consistency Presented: Ice chips; Thin liquid;Puree How Presented: Self-fed/presented;SLP-fed/presented;Spoon;Straw Bolus Acceptance: No impairment Bolus Formation/Control: No impairment Propulsion: No impairment Oral Residue: None Initiation of Swallow: Delayed (# of seconds)(suspect mild ) Laryngeal Elevation: Functional 
Aspiration Signs/Symptoms: None Pharyngeal Phase Characteristics: Other (comment)(suspected mild delay ) Comments: suspect esophageal component Oral Phase Severity: No impairment Pharyngeal Phase Severity : Mild NOMS:  
The NOMS functional outcome measure was used to quantify this patient's level of swallowing impairment. Based on the NOMS, the patient was determined to be at level 3 for swallow function NOMS Swallowing Levels: 
Level 1 (CN): NPO Level 2 (CM): NPO but takes consistency in therapy Level 3 (CL): Takes less than 50% of nutrition p.o. and continues with nonoral feedings; and/or safe with mod cues; and/or max diet restriction Level 4 (CK): Safe swallow but needs mod cues; and/or mod diet restriction; and/or still requires some nonoral feeding/supplements Level 5 (CJ): Safe swallow with min diet restriction; and/or needs min cues Level 6 (CI): Independent with p.o.; rare cues; usually self cues; may need to avoid some foods or needs extra time Level 7 (CH): Independent for all p.o. SANDI (2003). National Outcomes Measurement System (NOMS): Adult Speech-Language Pathology User's Guide. Pain: 
Pain Scale 1: Numeric (0 - 10) Pain Intensity 1: 0 After treatment:  
[]            Patient left in no apparent distress sitting up in chair [x]            Patient left in no apparent distress in bed 
[x]            Call bell left within reach [x]            Nursing notified 
[x]            Caregiver present 
[]            Bed alarm activated COMMUNICATION/EDUCATION:  
The patients plan of care including recommendations, planned interventions, and recommended diet changes were discussed with: Registered Nurse. [x]            Patient/family have participated as able in goal setting and plan of care. [x]            Patient/family agree to work toward stated goals and plan of care. []            Patient understands intent and goals of therapy, but is neutral about his/her participation. []            Patient is unable to participate in goal setting and plan of care. Thank you for this referral. 
Hannah Hsu M.CD. CCC-SLP Time Calculation: 20 mins

## 2019-02-21 NOTE — PROGRESS NOTES
Hospitalist Progress Note Yamile Arevalo MD 
Answering service: [phone] OR [phone] from in house phone Date of Service:  2019 NAME:  Tracy Hernandez :  1955 MRN:  [id number] PCP: Thomas Curtis NP Chief Complaint:  
Chief Complaint Patient presents with  Shortness of Breath Admission Summary:  
 
Tracy Hernandez is a 66 y.o. female who presented with SOB Interval history / Subjective:  
Patient seen for Follow up of SOB/metastatic lung ca Patient seen and examined by the bedside Labs, images and notes reviewed Patient was very anxious and restless yesterday and became hypotensive last evening, we expected the worse and was made comfort care. She is much more awake and hemodynamically improved today, still cont to be comfort care. Worried about pain and anxiety meds, which I explained that they will be provided by us and via hospice. Family to meet hospice team today [id number] at capacity, DW palliative and hospice team 
Denies any chest pain No fevers or chills Reports poor oral intake but today feels hungary. Oral intake encouraged Discussed with nursing staff,  orders reviewed. Assessment & Plan:  
 
Acute hypoxemic resp failure, POA Multifactorial, including pleural effusion, lung ca and COPD Refusing CT chest, breathing treatments S/p thoracentesis Questionable pneumonia, likely malignancy, stop ABX 
O2 support PRN opiates and BDZ for pain and anxiety Cont steroids, breathing treatments Advanced metastatic lung ca Likely stage IV Onc consult reviewed, poor prognosis, agree with palliative care, family and pt agreeable to Hospice, still have a lot of questions regarding logistics of hospice. DW family, palliative care team and hospice, awaiting an accepting hospice for final DC plan PRN meds for comfort care ordered Cachexia with severe protein calorie malnourishment, POA Likely due to malignancy and poor oral intake SLP and Nutritionist on board- recommend oral intake I would be reluctant to place PEG or even a Dobbhoff since I am not sure if she would be able to tolerate the procedure as she is high risk for decompensation. 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 
ICD-9-CM: 799.4  2/20/2019 Yes Failure to thrive in adult (Chronic) ICD-10-CM: R62.7 ICD-9-CM: 783.7  2/20/2019 Yes * (Principal) Pleural effusion, malignant (Chronic) ICD-10-CM: J91.0 ICD-9-CM: 511.81  2/20/2019 Yes Lung cancer ([facility] (Chronic) ICD-10-CM: C34.90 ICD-9-CM: 162.9  2/20/2019 Yes Shortness of breath ICD-10-CM: R06.02 
ICD-9-CM: 786.05  2/19/2019 Yes Review of Systems:  
Pertinent items are noted in HPI. Physical Examination:  
 
  General appearance: fatigued, distracted, severe distress, appears older than stated age, cachectic Throat: dry oral mucosa Lungs: coarse breath sounds, likely URT congestion, tachypnea Heart: sinus tachycardia Abdomen: soft, NT, NG Extremities: no edema, emaciated Neurologic: Grossly normal 
  
 
Vital Signs:  
 Last 24hrs VS reviewed since prior progress note. Most recent are: 
 
Visit Vitals BP (!) 138/95 (BP 1 Location: Left arm, BP Patient Position: At rest) Pulse (!) 128 Temp 98.7 °F (37.1 °C) Resp 23 Ht 5' 2\" (1.575 m) Wt 39.9 kg (88 lb) SpO2 98% BMI 16.10 kg/m² Intake/Output Summary (Last 24 hours) at 2/21/2019 1122 Last data filed at 2/20/2019 1344 Gross per 24 hour Intake  Output 200 ml Net -200 ml Tmax:  Temp (24hrs), Av.3 °F (36.8 °C), Min:97.4 °F (36.3 °C), Max:98.7 °F (37.1 °C) Data Review: Xr Chest Pa Lat Result Date: 2019 INDICATION: Follow-up abnormal chest radiograph COMPARISON: 2019 FINDINGS: PA and lateral views of the chest demonstrate a stable cardiomediastinal silhouette. Right internal jugular Port-A-Cath is unchanged in position. Moderately large right pleural effusion and right basilar airspace disease are not significantly changed. Previously seen nodular density in the superior segment of the right lower lobe is not significantly changed. The visualized osseous structures are unremarkable. IMPRESSION: Unchanged moderately large right pleural effusion, right basilar airspace disease, and right lower lobe pulmonary nodule. Xr Chest Pa Lat Result Date: 2019 EXAM: XR CHEST PA LAT INDICATION: SOB COMPARISON: None. FINDINGS: PA and lateral radiographs of the chest demonstrate a moderate size right pleural effusion. No left pleural effusion is shown. There is diminished volume of the right hemithorax with patchy atelectasis in the right middle lobe and right lower lobe. The left lung appears clear. There is a nodular density shown posteriorly in the right midlung overlying the right hilum measuring 2.4 cm in size. Right paratracheal thickening is also shown. There is no pneumothorax. Cardiac size is within normal limits. The bones are mildly osteopenic. A right IJ portacatheter is shown terminating at the cavoatrial junction. IMPRESSION: Diminished right hemithoracic volume with moderate right pleural effusion, 2.3 cm posterior right mid pulmonary mass, and right paratracheal mediastinal thickening. No results found for: [facility] Lab Results Component Value Date/Time Culture result: NO GROWTH AFTER 18 HOURS 2019 01:15 PM  
 
All Micro Results Procedure Component Value Units Date/Time CULTURE, BODY FLUID Yen Hernandez [[id number]] Collected:  02/20/19 1315 Order Status:  Completed Specimen:  Pleural Fluid Updated:  02/21/19 8936 Special Requests: NO SPECIAL REQUESTS     
  GRAM STAIN FEW WBCS SEEN     
   NO ORGANISMS SEEN Culture result: NO GROWTH AFTER 18 HOURS     
 CULTURE, RESPIRATORY/SPUTUM/BRONCH Murriel Mynor STAIN [[id number]] Order Status:  Canceled Specimen:  Sputum INFLUENZA A & B AG (RAPID TEST) [[id number]] Collected:  02/19/19 2130 Order Status:  Completed Specimen:  Nasopharyngeal from Nasal washing Updated:  02/19/19 2159 Influenza A Antigen NEGATIVE Influenza B Antigen NEGATIVE Labs:  
 
Recent Labs  
  02/20/19 0217 02/20/19 
0028 WBC 9.9 10.3 HGB 9.9* 10.2* HCT 32.6* 33.3*  
* 566* Recent Labs  
  02/20/19 
0217 02/20/19 
0028 02/19/19 
2040  137 140  
K 3.2* 3.2* 3.7  98 103 CO2 27 27 27 BUN 10 11 11 CREA 0.57 0.55 0.51* * 109* 107* CA 8.9 8.9 9.3 Recent Labs  
  02/18/19 
1800 SGOT 12* ALT 14  
AP 91 TBILI 0.3 TP 8.0 ALB 2.6*  
GLOB 5.4* Recent Labs  
  02/20/19 
1021 INR 1.2* PTP 12.3* No results for input(s): FE, TIBC, PSAT, FERR in the last 72 hours. No results found for: FOL, RBCF No results for input(s): PH, PCO2, PO2 in the last 72 hours. Recent Labs  
  02/20/19 
0028 CPK 29 CKNDX Cannot be calculated TROIQ <0.05 No results found for: CHOL, CHOLX, CHLST, CHOLV, HDL, LDL, LDLC, DLDLP, TGLX, TRIGL, TRIGP, CHHD, CHHDX No results found for: [facility] Lab Results Component Value Date/Time  Color YELLOW/STRAW 02/18/2019 06:47 PM  
 Appearance HAZY (A) 02/18/2019 06:47 PM  
 Specific gravity 1.020 02/18/2019 06:47 PM  
 pH (UA) 6.5 02/18/2019 06:47 PM  
 Protein TRACE (A) 02/18/2019 06:47 PM  
 Glucose NEGATIVE  02/18/2019 06:47 PM  
 Ketone 15 (A) 02/18/2019 06:47 PM  
 Bilirubin NEGATIVE  02/18/2019 06:47 PM  
 Urobilinogen 0.2 02/18/2019 06:47 PM  
 Nitrites NEGATIVE  02/18/2019 06:47 PM  
 Leukocyte Esterase TRACE (A) 02/18/2019 06:47 PM  
 Epithelial cells MANY (A) 02/18/2019 06:47 PM  
 Bacteria 1+ (A) 02/18/2019 06:47 PM  
 WBC 5-10 02/18/2019 06:47 PM  
 RBC 0-5 02/18/2019 06:47 PM  
 
 
 
Medications Reviewed:  
 
Current Facility-Administered Medications Medication Dose Route Frequency  acetaminophen (TYLENOL) 325 mg tablet  acetaminophen (TYLENOL) tablet 650 mg  650 mg Oral Q4H PRN  
 ALPRAZolam (XANAX) tablet 0.25 mg  0.25 mg Oral BID  morphine IR (MS IR) tablet 15 mg  15 mg Oral Q4H PRN  
 senna-docusate (PERICOLACE) 8.6-50 mg per tablet 1 Tab  1 Tab Oral QHS  predniSONE (DELTASONE) tablet 20 mg  20 mg Oral DAILY WITH BREAKFAST  0.9% sodium chloride infusion  10 mL/hr IntraVENous CONTINUOUS  
 LORazepam (ATIVAN) injection 1 mg  1 mg IntraVENous Q15MIN PRN  
 glycopyrrolate (ROBINUL) injection 0.2 mg  0.2 mg IntraVENous Q4H PRN  
 HYDROmorphone (PF) (DILAUDID) injection 1 mg  1 mg IntraVENous Q30MIN PRN  
 sodium chloride (NS) flush 5-40 mL  5-40 mL IntraVENous PRN  
 albuterol-ipratropium (DUO-NEB) 2.5 MG-0.5 MG/3 ML  3 mL Nebulization Q6H RT  
 
______________________________________________________________________ EXPECTED LENGTH OF STAY: 2d 9h 
ACTUAL LENGTH OF STAY:          2 Tiffany Mayers MD

## 2019-02-21 NOTE — PROGRESS NOTES
Palliative Medicine Consult George: [phone] Patient Name: Evangelina Jameson YOB: 1955 Date of Initial Consult: February 20, 2019 Reason for Consult: Care Decisions Requesting Provider:  Dr. Keyanna Venegas and Dr. Marianne Medina Primary Care Physician: Virginie Resendiz, ABDIRAHMAN 
 
 SUMMARY:  
Evangelina Jameson is a 66 y.o. female with a past history of advance lung cancer diagnosed in Sept 2017, who was admitted on 2/19/2019 from oncologist's office with a diagnosis of shortness of breath with hypoxia, cachexia with severe protien calorie malnutrition, anxiety, tachycardia, hypokalemia. The pt has been seen for her cancer at [facility], [address] [place] [address], and [address] [place] [address]. Intolerable side effects reported from spouse. Pt recently made decision that she no longer wanted any more chemo or radiation. Admitted with new rt pleural effusion Current medical issues leading to Palliative Medicine involvement include: support with care goals given poor prognosis and overwhelming symptoms Social:  45 years, 2 sons, born and raised [place], [address] [address], homemaker, loved photography and cooking, enjoyed family time playing board games PALLIATIVE DIAGNOSES:  
1. Shortness of breath 2. Agitation 3. Anxiety 4. Opioid induced Constipation 5. Cachexia PLAN:  
1. Patient alert and eating!!  
2. Seen with spouse and discussed plan moving forward 3. Decision made to transition home with hospice. [facility] has met with the pt and spouse but have no openings to accommodate them. Consult for an alternate hospice to be placed 4. Goal to return home today if all logistics settled 5. Opioid induced constipation: pericolace 1 cap hs 
6. Anxiety: xanax 0.25mg bid ordered 7. Dyspnea: msir 15mg q  Prn, fan, oxygen, head of the bed elevated, open window 8. COPD and pleural effusion~ steroids, antibiotics per primary team 
9. Hospice will titrate meds as needed 10. DDNR signed by the pt 11. Pastoral care providing spiritual support 12. Initial consult note routed to primary continuity provider and/or primary health care team members 13. Communicated plan of care with: [facility] 
 
 GOALS OF CARE / TREATMENT PREFERENCES:  
 
GOALS OF CARE: 
Patient/Health Care Proxy Stated Goals: Comfort TREATMENT PREFERENCES:  
Code Status: DNR Advance Care Planning: 
[x] The [facility] Interdisciplinary Team has updated the ACP Navigator with [place] and Patient Capacity Primary Decision Maker ([address]): spouse Medical Interventions: Comfort measures Other Instructions:  
Artificially Administered Nutrition: No feeding tube Other: As far as possible, the palliative care team has discussed with patient / health care proxy about goals of care / treatment preferences for patient. HISTORY:  
 
History obtained from:  chart CHIEF COMPLAINT:  Pt admitted for aforementioned issues HPI/SUBJECTIVE: The patient is:  
[x] Verbal and participatory [] Non-participatory due to:  
Baseline anxious affect for which she agrees to maintenance dose of xanax. No pain. Dyspnea compensated. Encouraged her to use opioids Clinical Pain Assessment (nonverbal scale for severity on nonverbal patients): Activity (Movement): Restless, excessive activity and/or withdrawal reflexes Duration: for how long has pt been experiencing pain (e.g., 2 days, 1 month, years) Frequency: how often pain is an issue (e.g., several times per day, once every few days, constant) FUNCTIONAL ASSESSMENT:  
 
Palliative Performance Scale (PPS): PPS: 20 
 
 
 PSYCHOSOCIAL/SPIRITUAL SCREENING:  
 
Palliative IDT has assessed this patient for cultural preferences / practices and a referral made as appropriate to needs (Cultural Services, Patient Advocacy, Ethics, etc.) Any spiritual / Christian concerns: 
[] Yes /  [x] No 
 
Caregiver Burnout: 
 [] Yes /  [x] No /  [] No Caregiver Present Anticipatory grief assessment:  
[x] Normal  / [] Maladaptive ESAS Anxiety: ESAS Depression:    
 
 
 REVIEW OF SYSTEMS:  
 
Positive and pertinent negative findings in ROS are noted above in HPI. The following systems were [x] reviewed / [x] unable to be reviewed as noted in HPI Other findings are noted below. Systems: constitutional, ears/nose/mouth/throat, respiratory, gastrointestinal, genitourinary, musculoskeletal, integumentary, neurologic, psychiatric, endocrine. Positive findings noted below. Modified ESAS Completed by: provider PHYSICAL EXAM:  
 
From RN flowsheet: 
Wt Readings from Last 3 Encounters:  
02/19/19 88 lb (39.9 kg) 02/19/19 85 lb 12.8 oz (38.9 kg) 02/18/19 88 lb (39.9 kg) Blood pressure 122/63, pulse (!) 105, temperature 97.4 °F (36.3 °C), resp. rate 14, height 5' 2\" (1.575 m), weight 88 lb (39.9 kg), SpO2 100 %. Pain Scale 1: Numeric (0 - 10) Pain Intensity 1: 0 Last bowel movement, if known:  
 
Constitutional: restless, thin, frail, alert, eating Eyes: pupils equal, anicteric ENMT: no nasal discharge, moist mucous membranes Cardiovascular: regular rhythm, distal pulses intact Respiratory: breathing labored, wheezing, symmetric Gastrointestinal: soft non-tender, +bowel sounds Musculoskeletal: no deformity, no tenderness to palpation Skin: warm, dry Neurologic: non focal 
Psychiatric: anxiety Other: 
 
 
 HISTORY:  
 
Principal Problem: 
  Pleural effusion, malignant (2/20/2019) Active Problems: 
  Shortness of breath (2/19/2019) Malignant cachexia ([address] (2/20/2019) Failure to thrive in adult (2/20/2019) Lung cancer ([address] (2/20/2019) Past Medical History:  
Diagnosis Date  Cancer ([address]  Lung cancer, primary, with metastasis from lung to other site, right ([address] Past Surgical History:  
Procedure Laterality Date  HX TUMOR REMOVAL History reviewed. No pertinent family history. History reviewed, no pertinent family history. Social History Tobacco Use  Smoking status: Former Smoker Last attempt to quit: 2017 Years since quittin.4  Smokeless tobacco: Never Used Substance Use Topics  Alcohol use: No  
  Frequency: Never No Known Allergies Current Facility-Administered Medications Medication Dose Route Frequency  acetaminophen (TYLENOL) 325 mg tablet  acetaminophen (TYLENOL) tablet 650 mg  650 mg Oral Q4H PRN  
 ALPRAZolam (XANAX) tablet 0.25 mg  0.25 mg Oral BID  morphine IR (MS IR) tablet 15 mg  15 mg Oral Q4H PRN  
 0.9% sodium chloride infusion  10 mL/hr IntraVENous CONTINUOUS  
 LORazepam (ATIVAN) injection 1 mg  1 mg IntraVENous Q15MIN PRN  
 glycopyrrolate (ROBINUL) injection 0.2 mg  0.2 mg IntraVENous Q4H PRN  
 HYDROmorphone (PF) (DILAUDID) injection 1 mg  1 mg IntraVENous Q30MIN PRN  
 sodium chloride (NS) flush 5-40 mL  5-40 mL IntraVENous PRN  
 albuterol-ipratropium (DUO-NEB) 2.5 MG-0.5 MG/3 ML  3 mL Nebulization Q6H RT  
 
 
 
 LAB AND IMAGING FINDINGS:  
 
Lab Results Component Value Date/Time WBC 9.9 2019 02:17 AM  
 HGB 9.9 (L) 2019 02:17 AM  
 PLATELET 563 (H)  02:17 AM  
 
Lab Results Component Value Date/Time Sodium 138 2019 02:17 AM  
 Potassium 3.2 (L) 2019 02:17 AM  
 Chloride 101 2019 02:17 AM  
 CO2 27 2019 02:17 AM  
 BUN 10 2019 02:17 AM  
 Creatinine 0.57 2019 02:17 AM  
 Calcium 8.9 2019 02:17 AM  
  
Lab Results Component Value Date/Time AST (SGOT) 12 (L) 2019 06:00 PM  
 Alk. phosphatase 91 2019 06:00 PM  
 Protein, total 8.0 2019 06:00 PM  
 Albumin 2.6 (L) 2019 06:00 PM  
 Globulin 5.4 (H) 2019 06:00 PM  
 
Lab Results Component Value Date/Time  INR 1.2 (H) 2019 10:21 AM  
 Prothrombin time 12.3 (H) 02/20/2019 10:21 AM  
  
No results found for: IRON, FE, TIBC, IBCT, PSAT, FERR No results found for: PH, PCO2, PO2 No components found for: [place] Lab Results Component Value Date/Time CK 29 02/20/2019 12:28 AM  
 CK - MB <1.0 02/20/2019 12:28 AM  
  
 
 
   
 
Total time: 35 minutes Counseling / coordination time, spent as noted above: 30 minutes 
> 50% counseling / coordination?: y 
 
Prolonged service was provided for  []30 min   []75 min in face to face time in the presence of the patient, spent as noted above. Time Start:  
Time End:  
Note: this can only be billed with 26285 (initial) or 21648 (follow up). If multiple start / stop times, list each separately.

## 2019-02-21 NOTE — PROGRESS NOTES
Follow up visit with Ms. Sandhya Li and her family. Pt's  and few other visitors were present at bedside.  provided an update, sharing that pt's condition has improved today. Pt requested continued prayers. Offered prayer at bedside. Assured them of ongoing  support as needed/desired. Marian Pennington, Palliative

## 2019-02-21 NOTE — HOSPICE
[facility]  Pt dramatically improved this morning, sitting up in bed eating breakfast.  Pt and  wanting to return home with hospice. [facility] code and is unable to accept new patients in the home. Information packet provided with 24hr contact information. Discussed pt with palliative NP Wilman. Informed pt and  that [facility] is at capacity. They are researching hospice agencies and will notify CM when they make a decision. Thank you for the opportunity to care for this pt and family. Please contact hospice at [phone] with any questions or concerns.

## 2019-02-21 NOTE — PROGRESS NOTES
Cm informed that [address] cannot accommodate. Cm met with patients' spouse to discuss other hospice agencies. They live in [place] and would like to use [facility]. Cm contacted them [phone]         f: [phone] and faxed orders, progress notes, face sheet. They will call me back with an update.  can be reached at [phone]. Cm informed  it could happen today. 2:15pm: Cm called [address] [place] [address] again to follow up ([phone]). They stated they would not be able to admit until tomorrow morning and asked that I go ahead and arrange ambulance transportation for 10 am. Referral sent to [facility] for a 10 am . Their hospice nurse will meet the patient and family there.   
 
Reza THOMASW, ACM

## 2019-02-21 NOTE — PROGRESS NOTES
Care Management Interventions PCP Verified by CM: Yes 
Palliative Care Criteria Met (RRAT>21 & CHF Dx)?: Yes 
Palliative Consult Recommended?: Yes Transition of Care Consult (CM Consult): Home Hospice [facility] Hospice: No 
Reason Outside [place] [address] Chosen: Unable to staff case Jose Signup: No 
Discharge Durable Medical Equipment: No 
Health Maintenance Reviewed: Yes Physical Therapy Consult: No 
Occupational Therapy Consult: No 
Speech Therapy Consult: No 
Current Support Network: Lives with Spouse Confirm Follow Up Transport: Family Plan discussed with Pt/Family/Caregiver: Yes Freedom of Choice Offered: Yes [employer] Information Provided?: No 
Discharge Location Discharge Placement: Home with hospice

## 2019-02-22 PROBLEM — J91.0 PLEURAL EFFUSION, MALIGNANT: Chronic | Status: RESOLVED | Noted: 2019-01-01 | Resolved: 2019-01-01

## 2019-02-22 NOTE — DISCHARGE SUMMARY
Inpatient hospitalist discharge summary Brief Overview PATIENT ID: Evangelina Jameson MRN: [id number] YOB: 1955 Admitting Provider: Diaz Leiva MD 
 
Discharging Provider: Jhon Hazel MD  
To contact this individual call [phone] and ask the  to page. If unavailable ask to be transferred the Adult Hospitalist Department. PCP at discharge: Virginie Resendiz NP [facility] 97 / [id number] [facility] [id number] Admission date: 2/19/2019  Date of Discharge: 02/22/19 Chief complaint:  
Chief Complaint Patient presents with  Shortness of Breath Patient Active Problem List  
Diagnosis Code  Shortness of breath R06.02  
 Malignant cachexia (HCC) R64  
 Failure to thrive in adult R62.7  Lung cancer ([facility] C34.90 Primary discharge diagnosis: 
Acute hypoxemic resp failure sec to malignant pleural effusion and stage IV lung ca Acute COPD exacerbation, POA Secondary discharge diagnosis: 
Advanced metastatic lung ca Cachexia and FTT, POA Hypokalemia Tachycardia Discharge Disposition: 
Home or Self Care Discharge activity: 
Ambulate in house and fall precautions Code status at discharge: DNR Active issues requiring follow up: 
Hospice care and control of symptoms Outpatient follow up: Follow-up Information Follow up With Specialties Details Why Contact Info Virginie Resendiz NP Nurse Practitioner Schedule an appointment as soon as possible for a visit in 1 week  [address] [facility] 39 
[phone] Pauline Barney NP Palliative Medicine  as recommended [address] [place] [address] PALLIATIVE MEDICINE [facility] 74 
[phone] Hospice at [address] [place] [address]   for Hospice services Narda Larkin MD Hematology and Oncology, Hematology, Oncology  as recommended [address] [facility] [address] [facility] [address] [id number] 
[phone] Test results pending upon discharge: 
n/a Details of hospital stay Presenting problem/ History of present illness: 
Shortness of breath [R06.02] Dima Pimentel is a 66 y.o. female presented with severe SOB Hospital Course: 
  
Acute hypoxemic resp failure, POA Multifactorial, including pleural effusion, lung ca and COPD Refusing CT chest, breathing treatments S/p thoracentesis Questionable pneumonia, likely malignancy, stop ABX 
O2 support as needed PRN opiates and BDZ for pain and anxiety Cont steroids, breathing treatments 
  
Advanced metastatic lung ca Likely stage IV Onc consult reviewed, poor prognosis, agree with palliative care, family and pt agreeable to Hospice. DW family, palliative care team and hospice, PRN meds for comfort care ordered 
  
Cachexia with severe protein calorie malnourishment, POA Likely due to malignancy and poor oral intake SLP and Nutritionist on board- recommend oral intake I would be reluctant to place PEG or even a Dobbhoff since I am not sure if she would be able to tolerate the procedure as she is high risk for decompensation. Family understands Will do regular diet 
  
Anxiety Severe Ativan PRN 
  
Tachycardia Hypokalemia On the date of discharge, diagnostic face to face encounter was performed. Anxiety is better today, still has wheezing. Patient is agreeable for discharge. Patient understood and verbalized the understanding of the discharge plan. Patient was advised to seek medical help/ care or return to ED, if symptoms recur, worsen or new symptoms develop. Operative procedures performed: 
 
 
Treatments: IV hydration and dilaudid, ativan, morphine Consults:  IP CONSULT TO PALLIATIVE CARE - PROVIDER 
IP CONSULT TO ONCOLOGY 
IP CONSULT TO INTERVENTIONAL RADIOLOGY 
IP CONSULT TO PALLIATIVE CARE - PROVIDER Procedures:   
none Diet:  DIET REGULAR Pertinent test results: 
Xr Chest Pa Lat Result Date: 2/20/2019 INDICATION: Follow-up abnormal chest radiograph COMPARISON: February 18, 2019 FINDINGS: PA and lateral views of the chest demonstrate a stable cardiomediastinal silhouette. Right internal jugular Port-A-Cath is unchanged in position. Moderately large right pleural effusion and right basilar airspace disease are not significantly changed. Previously seen nodular density in the superior segment of the right lower lobe is not significantly changed. The visualized osseous structures are unremarkable. IMPRESSION: Unchanged moderately large right pleural effusion, right basilar airspace disease, and right lower lobe pulmonary nodule. Xr Chest Pa Lat Result Date: 2/18/2019 EXAM: XR CHEST PA LAT INDICATION: SOB COMPARISON: None. FINDINGS: PA and lateral radiographs of the chest demonstrate a moderate size right pleural effusion. No left pleural effusion is shown. There is diminished volume of the right hemithorax with patchy atelectasis in the right middle lobe and right lower lobe. The left lung appears clear. There is a nodular density shown posteriorly in the right midlung overlying the right hilum measuring 2.4 cm in size. Right paratracheal thickening is also shown. There is no pneumothorax. Cardiac size is within normal limits. The bones are mildly osteopenic. A right IJ portacatheter is shown terminating at the cavoatrial junction. IMPRESSION: Diminished right hemithoracic volume with moderate right pleural effusion, 2.3 cm posterior right mid pulmonary mass, and right paratracheal mediastinal thickening. Xr Chest [facility] Result Date: 2/20/2019 EXAM: XR CHEST PORT INDICATION: Tachypenic COMPARISON: February 20, 2019 at 1047 FINDINGS: A portable AP radiograph of the chest was obtained at 1447 hours. The patient is on a cardiac monitor. Port-A-Cath overlies the SVC near the junction of the right atrium.  Right-sided pleural effusion has decreased. Right pleural effusion is small. There is mild atelectasis at the right lung base. Left lung is clear. IMPRESSION: 1. Decreased right pleural effusion. No pneumothorax Us Thoracentesis Cath W Image Result Date: 2/20/2019 CLINICAL HISTORY: Right pleural effusion. COMPARISON: Chest radiograph 2/20/2019. PROCEDURE: Ultrasound-guided right thoracentesis with 6 [place] pleural pigtail catheter placement and removal. OPERATORS: Breann Segovia MD. ANESTHESIA: buffered lidocaine local anesthetic. TECHNIQUE AND FINDINGS: The patient's relevant allergies, medications, laboratory results, and history were reviewed. The patient was identified by name and date of birth. The procedure, benefits, risks, and alternatives (including not having the procedure) were discussed. All of the patient's questions were answered. Informed verbal and written consent was obtained. The patient was positioned in a seated position and kyphosis was exaggerated by leaning forward. Targeted sonography of the right posterolateral chest was performed. The right pleural effusion was identified under ultrasound guidance and the overlying skin site was marked. A time-out procedure using two patient identifiers was performed and laterality confirmed. The skin was prepped and draped using usual sterile technique. Subcutaneous and deep tissues were anesthetized with local anesthetic. A small skin nick was made. A 6 [place] pigtail catheter was inserted into the right pleural space. 120 cc of clear yellow pleural fluid was aspirated and sent to pathology for analysis as ordered. A total of 200 cc was aspirated for therapeutic thoracentesis. Sonography showed resolution of the small pleural effusion. The catheter was removed. There were no immediate complications. The patient tolerated the procedure well.   
 
IMPRESSION: Uncomplicated ultrasound-guided right thoracentesis with 6 [place] pigtail catheter placement and removal.  
 
 
 Recent Results (from the past 336 hour(s)) EKG, 12 LEAD, INITIAL Collection Time: 02/18/19  4:50 PM  
Result Value Ref Range Ventricular Rate 132 BPM  
 Atrial Rate 132 BPM  
 P-R Interval 142 ms QRS Duration 54 ms Q-T Interval 278 ms QTC Calculation (Bezet) 411 ms Calculated P Axis 77 degrees Calculated R Axis 67 degrees Calculated T Axis 70 degrees Diagnosis Sinus tachycardia Biatrial enlargement Low voltage QRS No previous ECGs available Confirmed by Daphne Shannon M.D., Lebron Nora ([id number]) on 2/18/2019 5:02:55 PM 
  
SAMPLES BEING HELD Collection Time: 02/18/19  4:59 PM  
Result Value Ref Range SAMPLES BEING HELD 1PST,1RED,1LAV   
 COMMENT Add-on orders for these samples will be processed based on acceptable specimen integrity and analyte stability, which may vary by analyte. CBC WITH AUTOMATED DIFF Collection Time: 02/18/19  6:00 PM  
Result Value Ref Range WBC 10.4 3.6 - 11.0 K/uL  
 RBC 4.30 3.80 - 5.20 M/uL  
 HGB 10.6 (L) 11.5 - 16.0 g/dL HCT 35.5 35.0 - 47.0 % MCV 82.6 80.0 - 99.0 FL  
 MCH 24.7 (L) 26.0 - 34.0 PG  
 MCHC 29.9 (L) 30.0 - 36.5 g/dL  
 RDW 14.5 11.5 - 14.5 % PLATELET 140 (H) 452 - 400 K/uL MPV 10.1 8.9 - 12.9 FL  
 NRBC 0.0 0  WBC ABSOLUTE NRBC 0.00 0.00 - 0.01 K/uL NEUTROPHILS 85 (H) 32 - 75 % LYMPHOCYTES 6 (L) 12 - 49 % MONOCYTES 8 5 - 13 % EOSINOPHILS 1 0 - 7 % BASOPHILS 0 0 - 1 % IMMATURE GRANULOCYTES 1 (H) 0.0 - 0.5 % ABS. NEUTROPHILS 8.8 (H) 1.8 - 8.0 K/UL  
 ABS. LYMPHOCYTES 0.6 (L) 0.8 - 3.5 K/UL  
 ABS. MONOCYTES 0.9 0.0 - 1.0 K/UL  
 ABS. EOSINOPHILS 0.1 0.0 - 0.4 K/UL  
 ABS. BASOPHILS 0.0 0.0 - 0.1 K/UL  
 ABS. IMM. GRANS. 0.1 (H) 0.00 - 0.04 K/UL  
 DF AUTOMATED METABOLIC PANEL, COMPREHENSIVE Collection Time: 02/18/19  6:00 PM  
Result Value Ref Range Sodium 134 (L) 136 - 145 mmol/L Potassium 4.2 3.5 - 5.1 mmol/L  Chloride 99 97 - 108 mmol/L  
 CO2 29 21 - 32 mmol/L  
 Anion gap 6 5 - 15 mmol/L Glucose 112 (H) 65 - 100 mg/dL BUN 13 6 - 20 MG/DL Creatinine 0.74 0.55 - 1.02 MG/DL  
 BUN/Creatinine ratio 18 12 - 20 GFR est AA >60 >60 ml/min/1.73m2 GFR est non-AA >60 >60 ml/min/1.73m2 Calcium 9.9 8.5 - 10.1 MG/DL Bilirubin, total 0.3 0.2 - 1.0 MG/DL  
 ALT (SGPT) 14 12 - 78 U/L  
 AST (SGOT) 12 (L) 15 - 37 U/L Alk. phosphatase 91 45 - 117 U/L Protein, total 8.0 6.4 - 8.2 g/dL Albumin 2.6 (L) 3.5 - 5.0 g/dL Globulin 5.4 (H) 2.0 - 4.0 g/dL A-G Ratio 0.5 (L) 1.1 - 2.2 URINALYSIS W/ RFLX MICROSCOPIC Collection Time: 02/18/19  6:47 PM  
Result Value Ref Range Color YELLOW/STRAW Appearance HAZY (A) CLEAR Specific gravity 1.020 1.003 - 1.030    
 pH (UA) 6.5 5.0 - 8.0 Protein TRACE (A) NEG mg/dL Glucose NEGATIVE  NEG mg/dL Ketone 15 (A) NEG mg/dL Bilirubin NEGATIVE  NEG Blood NEGATIVE  NEG Urobilinogen 0.2 0.2 - 1.0 EU/dL Nitrites NEGATIVE  NEG Leukocyte Esterase TRACE (A) NEG    
SAMPLES BEING HELD Collection Time: 02/18/19  6:47 PM  
Result Value Ref Range SAMPLES BEING HELD  HOLD COMMENT Add-on orders for these samples will be processed based on acceptable specimen integrity and analyte stability, which may vary by analyte. URINE MICROSCOPIC ONLY Collection Time: 02/18/19  6:47 PM  
Result Value Ref Range WBC 5-10 0 - 4 /hpf  
 RBC 0-5 0 - 5 /hpf Epithelial cells MANY (A) FEW /lpf Bacteria 1+ (A) NEG /hpf  
SAMPLES BEING HELD Collection Time: 02/19/19  5:38 PM  
Result Value Ref Range SAMPLES BEING HELD 1LAV 1RED 1PST 1BLU   
 COMMENT Add-on orders for these samples will be processed based on acceptable specimen integrity and analyte stability, which may vary by analyte. CBC WITH AUTOMATED DIFF Collection Time: 02/19/19  5:38 PM  
Result Value Ref Range WBC 11.7 (H) 3.6 - 11.0 K/uL  
 RBC 4.37 3.80 - 5.20 M/uL  
 HGB 10.9 (L) 11.5 - 16.0 g/dL HCT 36.0 35.0 - 47.0 % MCV 82.4 80.0 - 99.0 FL  
 MCH 24.9 (L) 26.0 - 34.0 PG  
 MCHC 30.3 30.0 - 36.5 g/dL  
 RDW 15.0 (H) 11.5 - 14.5 % PLATELET 253 (H) 267 - 400 K/uL MPV 10.5 8.9 - 12.9 FL  
 NRBC 0.0 0  WBC ABSOLUTE NRBC 0.00 0.00 - 0.01 K/uL NEUTROPHILS 84 (H) 32 - 75 % LYMPHOCYTES 6 (L) 12 - 49 % MONOCYTES 9 5 - 13 % EOSINOPHILS 1 0 - 7 % BASOPHILS 0 0 - 1 % IMMATURE GRANULOCYTES 0 0.0 - 0.5 % ABS. NEUTROPHILS 9.8 (H) 1.8 - 8.0 K/UL  
 ABS. LYMPHOCYTES 0.7 (L) 0.8 - 3.5 K/UL  
 ABS. MONOCYTES 1.1 (H) 0.0 - 1.0 K/UL  
 ABS. EOSINOPHILS 0.1 0.0 - 0.4 K/UL  
 ABS. BASOPHILS 0.0 0.0 - 0.1 K/UL  
 ABS. IMM. GRANS. 0.0 0.00 - 0.04 K/UL  
 DF SMEAR SCANNED    
 RBC COMMENTS OVALOCYTES PRESENT 
    
 RBC COMMENTS MOSES CELLS 
PRESENT 
    
 RBC COMMENTS ANISOCYTOSIS 1+ 
    
 RBC COMMENTS HYPOCHROMIA 1+ METABOLIC PANEL, BASIC Collection Time: 02/19/19  8:40 PM  
Result Value Ref Range Sodium 140 136 - 145 mmol/L Potassium 3.7 3.5 - 5.1 mmol/L Chloride 103 97 - 108 mmol/L  
 CO2 27 21 - 32 mmol/L Anion gap 10 5 - 15 mmol/L Glucose 107 (H) 65 - 100 mg/dL BUN 11 6 - 20 MG/DL Creatinine 0.51 (L) 0.55 - 1.02 MG/DL  
 BUN/Creatinine ratio 22 (H) 12 - 20 GFR est AA >60 >60 ml/min/1.73m2 GFR est non-AA >60 >60 ml/min/1.73m2 Calcium 9.3 8.5 - 10.1 MG/DL INFLUENZA A & B AG (RAPID TEST) Collection Time: 02/19/19  9:30 PM  
Result Value Ref Range Influenza A Antigen NEGATIVE  NEG Influenza B Antigen NEGATIVE  NEG    
EKG, 12 LEAD, INITIAL Collection Time: 02/19/19 11:54 PM  
Result Value Ref Range Ventricular Rate 130 BPM  
 Atrial Rate 130 BPM  
 P-R Interval 130 ms QRS Duration 68 ms Q-T Interval 306 ms QTC Calculation (Bezet) 450 ms Calculated P Axis 74 degrees Calculated R Axis 61 degrees Calculated T Axis 72 degrees Diagnosis Sinus tachycardia Biatrial enlargement When compared with ECG of 18-FEB-2019 16:50, 
Criteria for Septal infarct are no longer present Confirmed by Camilla Tesfaye M.D., Kendal Mckeon ([id number]) on 2/20/2019 4:89:83 PM 
  
METABOLIC PANEL, BASIC Collection Time: 02/20/19 12:28 AM  
Result Value Ref Range Sodium 137 136 - 145 mmol/L Potassium 3.2 (L) 3.5 - 5.1 mmol/L Chloride 98 97 - 108 mmol/L  
 CO2 27 21 - 32 mmol/L Anion gap 12 5 - 15 mmol/L Glucose 109 (H) 65 - 100 mg/dL BUN 11 6 - 20 MG/DL Creatinine 0.55 0.55 - 1.02 MG/DL  
 BUN/Creatinine ratio 20 12 - 20 GFR est AA >60 >60 ml/min/1.73m2 GFR est non-AA >60 >60 ml/min/1.73m2 Calcium 8.9 8.5 - 10.1 MG/DL  
CBC W/O DIFF Collection Time: 02/20/19 12:28 AM  
Result Value Ref Range WBC 10.3 3.6 - 11.0 K/uL  
 RBC 4.07 3.80 - 5.20 M/uL  
 HGB 10.2 (L) 11.5 - 16.0 g/dL HCT 33.3 (L) 35.0 - 47.0 % MCV 81.8 80.0 - 99.0 FL  
 MCH 25.1 (L) 26.0 - 34.0 PG  
 MCHC 30.6 30.0 - 36.5 g/dL  
 RDW 14.4 11.5 - 14.5 % PLATELET 905 (H) 570 - 400 K/uL MPV 9.6 8.9 - 12.9 FL  
 NRBC 0.0 0  WBC ABSOLUTE NRBC 0.00 0.00 - 0.01 K/uL  
CK W/ CKMB & INDEX Collection Time: 02/20/19 12:28 AM  
Result Value Ref Range CK 29 26 - 192 U/L  
 CK - MB <1.0 <3.6 NG/ML  
 CK-MB Index Cannot be calculated 0 - 2.5    
TROPONIN I Collection Time: 02/20/19 12:28 AM  
Result Value Ref Range Troponin-I, Qt. <0.05 <0.05 ng/mL NT-PRO BNP Collection Time: 02/20/19 12:28 AM  
Result Value Ref Range NT pro- (H) 0 - 125 PG/ML  
TSH 3RD GENERATION Collection Time: 02/20/19 12:28 AM  
Result Value Ref Range TSH 1.21 0.36 - 3.74 uIU/mL METABOLIC PANEL, BASIC Collection Time: 02/20/19  2:17 AM  
Result Value Ref Range Sodium 138 136 - 145 mmol/L Potassium 3.2 (L) 3.5 - 5.1 mmol/L Chloride 101 97 - 108 mmol/L  
 CO2 27 21 - 32 mmol/L Anion gap 10 5 - 15 mmol/L Glucose 119 (H) 65 - 100 mg/dL BUN 10 6 - 20 MG/DL  Creatinine 0.57 0.55 - 1.02 MG/DL  
 BUN/Creatinine ratio 18 12 - 20 GFR est AA >60 >60 ml/min/1.73m2 GFR est non-AA >60 >60 ml/min/1.73m2 Calcium 8.9 8.5 - 10.1 MG/DL  
CBC W/O DIFF Collection Time: 02/20/19  2:17 AM  
Result Value Ref Range WBC 9.9 3.6 - 11.0 K/uL  
 RBC 4.03 3.80 - 5.20 M/uL HGB 9.9 (L) 11.5 - 16.0 g/dL HCT 32.6 (L) 35.0 - 47.0 % MCV 80.9 80.0 - 99.0 FL  
 MCH 24.6 (L) 26.0 - 34.0 PG  
 MCHC 30.4 30.0 - 36.5 g/dL  
 RDW 14.6 (H) 11.5 - 14.5 % PLATELET 901 (H) 286 - 400 K/uL MPV 9.5 8.9 - 12.9 FL  
 NRBC 0.0 0  WBC ABSOLUTE NRBC 0.00 0.00 - 0.01 K/uL PROTHROMBIN TIME + INR Collection Time: 02/20/19 10:21 AM  
Result Value Ref Range INR 1.2 (H) 0.9 - 1.1 Prothrombin time 12.3 (H) 9.0 - 11.1 sec CULTURE, BODY FLUID W GRAM STAIN Collection Time: 02/20/19  1:15 PM  
Result Value Ref Range Special Requests: NO SPECIAL REQUESTS    
 GRAM STAIN FEW WBCS SEEN    
 GRAM STAIN NO ORGANISMS SEEN Culture result: NO GROWTH AFTER 18 HOURS    
LDH, BODY FLUID Collection Time: 02/20/19  1:15 PM  
Result Value Ref Range Fluid Type: PLEURAL FLUID    
 LD, body fld. 48 U/L  
PH, FLUID Collection Time: 02/20/19  1:15 PM  
Result Value Ref Range FLUID TYPE(15) PLEURAL FLUID FLUID PH 8.0 PROTEIN TOTAL, FLUID Collection Time: 02/20/19  1:15 PM  
Result Value Ref Range Fluid Type: PLEURAL FLUID Protein total, body fld. 2.6 g/dL SAMPLES BEING HELD Collection Time: 02/20/19  1:15 PM  
Result Value Ref Range SAMPLES BEING HELD LAV TOP PLEU FLUID COMMENT Add-on orders for these samples will be processed based on acceptable specimen integrity and analyte stability, which may vary by analyte. POC EG7 Collection Time: 02/20/19  2:52 PM  
Result Value Ref Range Calcium, ionized (POC) 1.21 1.12 - 1.32 mmol/L  
 pH (POC) 7.281 (L) 7.35 - 7.45    
 pCO2 (POC) 52.1 (H) 35.0 - 45.0 MMHG  
 pO2 (POC) 68 (L) 80 - 100 MMHG HCO3 (POC) 24.5 22 - 26 MMOL/L Base deficit (POC) 2 mmol/L  
 sO2 (POC) 90 (L) 92 - 97 % Site RIGHT RADIAL Device: NASAL CANNULA Flow rate (POC) 2 L/M Allens test (POC) YES Specimen type (POC) ARTERIAL Total resp. rate 24 Physical Exam on Discharge: 
 
Discharge condition: guarded, poor prognosis Vital signs:  
Patient Vitals for the past 12 hrs: 
 Temp Pulse Resp BP SpO2  
02/22/19 0831 98.5 °F (36.9 °C) (!) 129 21 119/83 96 % General appearance: fatigued, appears older than stated age, cachectic Lungs: coarse breath sounds throughout Current Discharge Medication List  
  
START taking these medications Details  
acetaminophen (TYLENOL) 325 mg tablet Take 2 Tabs by mouth every four (4) hours as needed. Qty: 90 Tab, Refills: 0  
  
albuterol-ipratropium (DUO-NEB) 2.5 mg-0.5 mg/3 ml nebu 3 mL by Nebulization route every six (6) hours as needed. Qty: 30 Nebule, Refills: 0  
  
famotidine (PEPCID) 20 mg tablet Take 1 Tab by mouth two (2) times a day. Qty: 60 Tab, Refills: 0  
  
guaiFENesin (ROBITUSSIN) 100 mg/5 mL liquid Take 20 mL by mouth every twelve (12) hours. Qty: 355 mL, Refills: 0  
  
predniSONE (DELTASONE) 20 mg tablet Take 20 mg by mouth daily (with breakfast) for 5 days. Qty: 5 Tab, Refills: 0  
  
senna-docusate (PERICOLACE) 8.6-50 mg per tablet Take 1 Tab by mouth nightly. Qty: 30 Tab, Refills: 0  
  
naloxone (NARCAN) 4 mg/actuation nasal spray Use 1 spray intranasally, then discard. Repeat with new spray every 2 min as needed for opioid overdose symptoms, alternating nostrils. Qty: 1 Each, Refills: 0  
  
morphine (ROXANOL) 100 mg/5 mL (20 mg/mL) concentrated solution Take 0.25 mL by mouth every one (1) hour as needed for Pain. Max Daily Amount: 120 mg. 
Qty: 15 mL, Refills: 0 Associated Diagnoses: Stage 4 malignant neoplasm of lung, unspecified laterality ([address] Encounter for palliative care;  [facility] Pleural effusion, malignant; DNR (do not resuscitate) discussion; Dying care LORazepam (INTENSOL) 2 mg/mL concentrated solution Take 0.25 mL by mouth every one (1) hour as needed for Agitation or Anxiety. Max Daily Amount: 12 mg. 
Qty: 30 mL, Refills: 0 Associated Diagnoses: Encounter for palliative care; Stage 4 malignant neoplasm of lung, unspecified laterality ([facility] Pleural effusion, malignant; Agitation; DNR (do not resuscitate) discussion Total time spent on discharge planning, counseling and co-ordination of care:  
34  minutes Michele Bran MD 
02/22/19 
9:13 AM 
 
 
 
NOTIFY YOUR PHYSICIAN FOR ANY OF THE FOLLOWING:  
Fever over 101 degrees for 24 hours. Chest pain, shortness of breath, fever, chills, nausea, vomiting, diarrhea, change in mentation, falling, weakness, bleeding. Severe pain or pain not relieved by medications. Or, any other signs or symptoms that you may have questions about.

## 2019-02-22 NOTE — HOSPICE
[facility] RN note:  Courtesy visit to assist with transition to home with [facility]. Pt very dyspneic with extreme wheezing. Pt willing to take 0.5mg of IV dilaudid before de accessing the PORT. Pt having difficulty taking pills. Dr Leeann Caballero provided additional scripts for sl ativan and morphine. Attempted to provide report to accepting hospice RN. Pt expressed noticeable relief form IV dilaudid prior to transport. DDNR sent with pt. Thank you for the opportunity to care for this pt and family. Please contact hospice at [phone] with any questions or concerns.

## 2019-02-22 NOTE — DISCHARGE INSTRUCTIONS
Discharge Instructions       PATIENT ID: Myrna Bran  MRN: [id number]   YOB: 1955    DATE OF ADMISSION: 2/19/2019  6:25 PM    DATE OF DISCHARGE: 2/22/2019    PRIMARY CARE PROVIDER: Tim Fierro NP     ATTENDING PHYSICIAN: Lauren Del Cid MD  DISCHARGING PROVIDER: Ghassan Boo MD    To contact this individual call [phone] and ask the  to page. If unavailable ask to be transferred the Adult Hospitalist Department. DISCHARGE DIAGNOSES   Metastatic lung ca with failure to thrive, malignant pleural effusion  Comfort care status    CONSULTATIONS: IP CONSULT TO PALLIATIVE CARE - PROVIDER  IP CONSULT TO ONCOLOGY  IP CONSULT TO INTERVENTIONAL RADIOLOGY  IP CONSULT TO PALLIATIVE CARE - PROVIDER    PROCEDURES/SURGERIES: * No surgery found *    PENDING TEST RESULTS:   At the time of discharge the following test results are still pending: n/a    FOLLOW UP APPOINTMENTS:   Follow-up Information     Follow up With Specialties Details Why Contact Info    Alexandre Blackburn NP Nurse Practitioner Schedule an appointment as soon as possible for a visit in 1 week  [address] [place] [address], [facility] [phone], NP Palliative Medicine  as recommended [address]  [phone]      Hospice at [address] [place] [address]   for Hospice services            ADDITIONAL CARE RECOMMENDATIONS:   · It is important that you take the medication exactly as they are prescribed. · Keep your medication in the bottles provided by the pharmacist and keep a list of the medication names, dosages, and times to be taken in your wallet. · Do not take other medications without consulting your doctor. · No drinking alcohol or driving car or operating machinery if you are on narcotic pain medications. Donot take sedating mediations if you are sleepy or confused.    · Fall Precautions  · Keep Well Hydrated  · Report to your medical provider if you feel you have  developed allergies to medications  · Follow up with your PCP or Consultant for medication adjustments and refills  · Monitor for signs of fevers,chills,bleeding,chest pain and seek medical attention if you do so. DIET: DIET REGULAR    ACTIVITY: Ambulate in house and fall precautions    WOUND CARE: n/a    EQUIPMENT needed: n/a      DISCHARGE MEDICATIONS:   See Medication Reconciliation Form    · It is important that you take the medication exactly as they are prescribed. · Keep your medication in the bottles provided by the pharmacist and keep a list of the medication names, dosages, and times to be taken in your wallet. · Do not take other medications without consulting your doctor. NOTIFY YOUR PHYSICIAN FOR ANY OF THE FOLLOWING:   Fever over 101 degrees for 24 hours. Chest pain, shortness of breath, fever, chills, nausea, vomiting, diarrhea, change in mentation, falling, weakness, bleeding. Severe pain or pain not relieved by medications. Or, any other signs or symptoms that you may have questions about.       DISPOSITION:    Home With:   OT  PT  [facility]  RN       SNF/Inpatient Rehab/LTAC    Independent/assisted living   x Hospice    Other:         Signed:   Rosena Meigs, MD  2/22/2019  9:11 AM

## 2019-02-22 NOTE — PROGRESS NOTES
Palliative Medicine Consult George: [phone]) Patient Name: Leighton Mcfarland YOB: 1955 Date of Initial Consult: February 20, 2019 Reason for Consult: Care Decisions Requesting Provider:  Dr. Daisy Vasquez and Dr. Sameera Mccormick Primary Care Physician: Zuleima Shankar NP 
 
 SUMMARY:  
Leighton Mcfarland is a 66 y.o. female with a past history of advance lung cancer diagnosed in Sept 2017, who was admitted on 2/19/2019 from oncologist's office with a diagnosis of shortness of breath with hypoxia, cachexia with severe protien calorie malnutrition, anxiety, tachycardia, hypokalemia. The pt has been seen for her cancer at [facility], [facility], [address] [place] [address], and [address] [place] [address]. Intolerable side effects reported from spouse. Pt recently made decision that she no longer wanted any more chemo or radiation. Admitted with new rt pleural effusion Current medical issues leading to Palliative Medicine involvement include: support with care goals given poor prognosis and overwhelming symptoms Social:  47 years, 2 sons, born and raised [place], [address] [address], homemaker, loved photography and cooking, enjoyed family time playing board games PALLIATIVE DIAGNOSES:  
1. Shortness of breath 2. Anxiety 3. Opioid induced Constipation 4. Cachexia PLAN:  
1. Plans for dc today 2. Pt without any new complaints. Wheezing remains prominent. Unsure what else to do. Will try morphine neb x 1  
3. Hospice enrollment once home 4. Opioid induced constipation: pericolace 1 cap hs 
5. Anxiety: xanax 0.25mg bid ordered. Pt can't tell if it made a difference or not. Appears less anxious today 6. Dyspnea: msir 15mg q  Prn, fan, oxygen, head of the bed elevated, open window 7. COPD and pleural effusion~ steroids, antibiotics per primary team 
8. Hospice will titrate meds as needed 9. DDNR signed by the pt 10. Pastoral care providing spiritual support 11.  Initial consult note routed to primary continuity provider and/or primary health care team members 12. Communicated plan of care with: Palliative IDTMelinda 192 Team 
 
 GOALS OF CARE / TREATMENT PREFERENCES:  
 
GOALS OF CARE: 
Patient/Health Care Proxy Stated Goals: Comfort TREATMENT PREFERENCES:  
Code Status: DNR Advance Care Planning: 
[x] [facility] Interdisciplinary Team has updated the ACP Navigator with Jassn and Patient Capacity Primary Decision Maker ([address]): spouse Medical Interventions: Comfort measures Other Instructions:  
Artificially Administered Nutrition: No feeding tube Other: As far as possible, the palliative care team has discussed with patient / health care proxy about goals of care / treatment preferences for patient. HISTORY:  
 
History obtained from:  chart CHIEF COMPLAINT:  Pt admitted for aforementioned issues HPI/SUBJECTIVE: The patient is:  
[x] Verbal and participatory [] Non-participatory due to:  
Pt looking forward to going home. No new s/s. Willing to try morphine neb for wheezing Clinical Pain Assessment (nonverbal scale for severity on nonverbal patients):  
Clinical Pain Assessment Severity: 0 Activity (Movement): Restless, excessive activity and/or withdrawal reflexes Duration: for how long has pt been experiencing pain (e.g., 2 days, 1 month, years) Frequency: how often pain is an issue (e.g., several times per day, once every few days, constant) FUNCTIONAL ASSESSMENT:  
 
Palliative Performance Scale (PPS): PPS: 50 PSYCHOSOCIAL/SPIRITUAL SCREENING:  
 
Palliative IDT has assessed this patient for cultural preferences / practices and a referral made as appropriate to needs (Cultural Services, Patient Advocacy, Ethics, etc.) Any spiritual / Oriental orthodox concerns: 
[] Yes /  [x] No 
 
Caregiver Burnout: 
[] Yes /  [x] No /  [] No Caregiver Present Anticipatory grief assessment:  
[x] Normal  / [] Maladaptive ESAS Anxiety: Anxiety: 4 ESAS Depression: Depression: 0 REVIEW OF SYSTEMS:  
 
Positive and pertinent negative findings in ROS are noted above in HPI. The following systems were [x] reviewed / [x] unable to be reviewed as noted in HPI Other findings are noted below. Systems: constitutional, ears/nose/mouth/throat, respiratory, gastrointestinal, genitourinary, musculoskeletal, integumentary, neurologic, psychiatric, endocrine. Positive findings noted below. Modified ESAS Completed by: provider Fatigue: 1 Drowsiness: 0 Depression: 0 Pain: 0 Anxiety: 4 Nausea: 0 Anorexia: 3 Dyspnea: 3 PHYSICAL EXAM:  
 
From RN flowsheet: 
Wt Readings from Last 3 Encounters:  
02/19/19 88 lb (39.9 kg) 02/19/19 85 lb 12.8 oz (38.9 kg) 02/18/19 88 lb (39.9 kg) Blood pressure 119/83, pulse (!) 129, temperature 98.5 °F (36.9 °C), resp. rate 21, height 5' 2\" (1.575 m), weight 88 lb (39.9 kg), SpO2 96 %. Pain Scale 1: Numeric (0 - 10) Pain Intensity 1: 3 Pain Onset 1: acute Pain Location 1: Abdomen Pain Orientation 1: Anterior Pain Description 1: Aching Pain Intervention(s) 1: Medication (see MAR) Last bowel movement, if known:  
 
Constitutional: restless, thin, frail, alert, eating Eyes: pupils equal, anicteric ENMT: no nasal discharge, moist mucous membranes Cardiovascular: regular rhythm, distal pulses intact Respiratory: breathing less labored, wheezing, symmetric Gastrointestinal: soft non-tender, +bowel sounds Musculoskeletal: no deformity, no tenderness to palpation Skin: warm, dry Neurologic: non focal 
Psychiatric: calm, anxious baseline Other: 
 
 
 HISTORY:  
 
Principal Problem: 
  Pleural effusion, malignant (2/20/2019) Active Problems: 
  Shortness of breath (2/19/2019) Malignant cachexia ([facility] (2/20/2019) Failure to thrive in adult (2/20/2019) Lung cancer ([facility] (2/20/2019) Past Medical History:  
Diagnosis Date  [facility]  Lung cancer, primary, with metastasis from lung to other site, right ([address] Past Surgical History:  
Procedure Laterality Date  HX TUMOR REMOVAL History reviewed. No pertinent family history. History reviewed, no pertinent family history. Social History Tobacco Use  Smoking status: Former Smoker Last attempt to quit: 2017 Years since quittin.4  Smokeless tobacco: Never Used Substance Use Topics  Alcohol use: No  
  Frequency: Never No Known Allergies Current Facility-Administered Medications Medication Dose Route Frequency  guaiFENesin (ROBITUSSIN) 100 mg/5 mL oral liquid 400 mg  400 mg Oral Q12H  
 morphine injection 4 mg  4 mg Inhalation NOW  acetaminophen (TYLENOL) tablet 650 mg  650 mg Oral Q4H PRN  
 ALPRAZolam (XANAX) tablet 0.25 mg  0.25 mg Oral BID  morphine IR (MS IR) tablet 15 mg  15 mg Oral Q4H PRN  
 senna-docusate (PERICOLACE) 8.6-50 mg per tablet 1 Tab  1 Tab Oral QHS  predniSONE (DELTASONE) tablet 20 mg  20 mg Oral DAILY WITH BREAKFAST  famotidine (PEPCID) tablet 20 mg  20 mg Oral BID  
 0.9% sodium chloride infusion  10 mL/hr IntraVENous CONTINUOUS  
 LORazepam (ATIVAN) injection 1 mg  1 mg IntraVENous Q15MIN PRN  
 glycopyrrolate (ROBINUL) injection 0.2 mg  0.2 mg IntraVENous Q4H PRN  
 HYDROmorphone (PF) (DILAUDID) injection 1 mg  1 mg IntraVENous Q30MIN PRN  
 sodium chloride (NS) flush 5-40 mL  5-40 mL IntraVENous PRN  
 albuterol-ipratropium (DUO-NEB) 2.5 MG-0.5 MG/3 ML  3 mL Nebulization Q6H RT  
 
 
 
 LAB AND IMAGING FINDINGS:  
 
Lab Results Component Value Date/Time WBC 9.9 2019 02:17 AM  
 HGB 9.9 (L) 2019 02:17 AM  
 PLATELET 745 (H)  02:17 AM  
 
Lab Results Component Value Date/Time  Sodium 138 2019 02:17 AM  
 Potassium 3.2 (L) 2019 02:17 AM  
 Chloride 101 2019 02:17 AM  
 CO2 27 2019 02:17 AM  
 BUN 10 2019 02:17 AM  
 Creatinine 0.57 02/20/2019 02:17 AM  
 Calcium 8.9 02/20/2019 02:17 AM  
  
Lab Results Component Value Date/Time AST (SGOT) 12 (L) 02/18/2019 06:00 PM  
 Alk. phosphatase 91 02/18/2019 06:00 PM  
 Protein, total 8.0 02/18/2019 06:00 PM  
 Albumin 2.6 (L) 02/18/2019 06:00 PM  
 Globulin 5.4 (H) 02/18/2019 06:00 PM  
 
Lab Results Component Value Date/Time INR 1.2 (H) 02/20/2019 10:21 AM  
 Prothrombin time 12.3 (H) 02/20/2019 10:21 AM  
  
No results found for: IRON, FE, TIBC, IBCT, PSAT, FERR No results found for: PH, PCO2, PO2 No components found for: [place] Lab Results Component Value Date/Time CK 29 02/20/2019 12:28 AM  
 CK - MB <1.0 02/20/2019 12:28 AM  
  
 
 
   
 
Total time: 35 minutes Counseling / coordination time, spent as noted above: 30 minutes 
> 50% counseling / coordination?: y 
 
Prolonged service was provided for  []30 min   []75 min in face to face time in the presence of the patient, spent as noted above. Time Start:  
Time End:  
Note: this can only be billed with 75792 (initial) or 38736 (follow up). If multiple start / stop times, list each separately.

## 2019-04-26 ENCOUNTER — DOCUMENTATION ONLY (OUTPATIENT)
Dept: ONCOLOGY | Age: 64
End: 2019-04-26

## 2019-04-26 NOTE — PROGRESS NOTES
3 CDs from [facility] to [employer] for delivery to File Room. File Room form complete. Discs do not need to be returned.

## 2024-01-09 NOTE — PROGRESS NOTES
MD is speaking with Dr. Self    Patient transferred to ED via wheelchair with  and all belongings for eval of ongoing shortness of breath and weight loss. Report to ED triage nurse. Condition stable on transfer.